# Patient Record
Sex: MALE | Race: WHITE | NOT HISPANIC OR LATINO | Employment: PART TIME | URBAN - METROPOLITAN AREA
[De-identification: names, ages, dates, MRNs, and addresses within clinical notes are randomized per-mention and may not be internally consistent; named-entity substitution may affect disease eponyms.]

---

## 2019-05-10 ENCOUNTER — TELEPHONE (OUTPATIENT)
Dept: INTERNAL MEDICINE | Facility: CLINIC | Age: 34
End: 2019-05-10

## 2019-05-10 NOTE — TELEPHONE ENCOUNTER
PATIENT CALLED TO LET DR. IRENE KNOW THAT HE WALKED 1,543 STEPS TODAY AND HE IS PROUD OF HIMSELF.    GOING TO THE [facility] AND WORKING WITH A  AND HE FEELS GOOD ABOUT HIMSELF.

## 2019-06-18 PROBLEM — Z00.00 ANNUAL PHYSICAL EXAM: Status: ACTIVE | Noted: 2019-06-18

## 2019-06-18 PROBLEM — F33.1 MODERATE EPISODE OF RECURRENT MAJOR DEPRESSIVE DISORDER: Status: ACTIVE | Noted: 2019-06-18

## 2019-06-18 PROBLEM — N18.2 CHRONIC KIDNEY DISEASE, STAGE 2 (MILD): Status: ACTIVE | Noted: 2019-06-18

## 2019-06-18 PROBLEM — F42.9 OBSESSIVE COMPULSIVE DISORDER: Status: ACTIVE | Noted: 2019-06-18

## 2019-06-18 PROBLEM — J30.9 ALLERGIC RHINITIS: Status: ACTIVE | Noted: 2019-06-18

## 2019-06-18 PROBLEM — F84.5 ASPERGER'S DISORDER: Status: ACTIVE | Noted: 2019-06-18

## 2019-06-18 PROBLEM — R73.03 PREDIABETES: Status: ACTIVE | Noted: 2019-06-18

## 2019-06-18 PROBLEM — E78.5 HYPERLIPIDEMIA: Status: ACTIVE | Noted: 2019-06-18

## 2019-06-18 PROBLEM — F90.9 ATTENTION DEFICIT DISORDER OF CHILDHOOD WITH HYPERACTIVITY: Status: ACTIVE | Noted: 2019-06-18

## 2019-06-18 PROBLEM — F41.1 ANXIETY STATE: Status: ACTIVE | Noted: 2019-06-18

## 2019-06-18 PROBLEM — R00.0 TACHYCARDIA: Status: ACTIVE | Noted: 2019-06-18

## 2019-07-02 ENCOUNTER — OFFICE VISIT (OUTPATIENT)
Dept: INTERNAL MEDICINE | Facility: CLINIC | Age: 34
End: 2019-07-02

## 2019-07-02 ENCOUNTER — LAB (OUTPATIENT)
Dept: LAB | Facility: HOSPITAL | Age: 34
End: 2019-07-02

## 2019-07-02 VITALS
DIASTOLIC BLOOD PRESSURE: 80 MMHG | HEART RATE: 64 BPM | HEIGHT: 64 IN | BODY MASS INDEX: 27.14 KG/M2 | SYSTOLIC BLOOD PRESSURE: 126 MMHG | WEIGHT: 159 LBS

## 2019-07-02 DIAGNOSIS — R73.03 PREDIABETES: ICD-10-CM

## 2019-07-02 DIAGNOSIS — N18.2 CHRONIC KIDNEY DISEASE, STAGE 2 (MILD): ICD-10-CM

## 2019-07-02 DIAGNOSIS — F33.1 MODERATE EPISODE OF RECURRENT MAJOR DEPRESSIVE DISORDER (HCC): ICD-10-CM

## 2019-07-02 DIAGNOSIS — E78.2 MIXED HYPERLIPIDEMIA: ICD-10-CM

## 2019-07-02 DIAGNOSIS — F84.5 ASPERGER'S DISORDER: ICD-10-CM

## 2019-07-02 DIAGNOSIS — Z00.00 ANNUAL PHYSICAL EXAM: Primary | ICD-10-CM

## 2019-07-02 LAB
ALBUMIN SERPL-MCNC: 4.4 G/DL (ref 3.5–5.2)
ALBUMIN/GLOB SERPL: 1.3 G/DL
ALP SERPL-CCNC: 56 U/L (ref 39–117)
ALT SERPL W P-5'-P-CCNC: 50 U/L (ref 1–41)
ANION GAP SERPL CALCULATED.3IONS-SCNC: 13.3 MMOL/L (ref 5–15)
AST SERPL-CCNC: 31 U/L (ref 1–40)
BASOPHILS # BLD AUTO: 0.08 10*3/MM3 (ref 0–0.2)
BASOPHILS NFR BLD AUTO: 0.9 % (ref 0–1.5)
BILIRUB SERPL-MCNC: 0.3 MG/DL (ref 0.2–1.2)
BUN BLD-MCNC: 9 MG/DL (ref 6–20)
BUN/CREAT SERPL: 6.9 (ref 7–25)
CALCIUM SPEC-SCNC: 10 MG/DL (ref 8.6–10.5)
CHLORIDE SERPL-SCNC: 102 MMOL/L (ref 98–107)
CHOLEST SERPL-MCNC: 209 MG/DL (ref 0–200)
CO2 SERPL-SCNC: 27.7 MMOL/L (ref 22–29)
CREAT BLD-MCNC: 1.31 MG/DL (ref 0.76–1.27)
DEPRECATED RDW RBC AUTO: 42.5 FL (ref 37–54)
EOSINOPHIL # BLD AUTO: 0.13 10*3/MM3 (ref 0–0.4)
EOSINOPHIL NFR BLD AUTO: 1.4 % (ref 0.3–6.2)
ERYTHROCYTE [DISTWIDTH] IN BLOOD BY AUTOMATED COUNT: 12.9 % (ref 12.3–15.4)
GFR SERPL CREATININE-BSD FRML MDRD: 63 ML/MIN/1.73
GLOBULIN UR ELPH-MCNC: 3.5 GM/DL
GLUCOSE BLD-MCNC: 88 MG/DL (ref 65–99)
HBA1C MFR BLD: 5.8 % (ref 4.8–5.6)
HCT VFR BLD AUTO: 50.8 % (ref 37.5–51)
HDLC SERPL-MCNC: 30 MG/DL (ref 40–60)
HGB BLD-MCNC: 16.1 G/DL (ref 13–17.7)
IMM GRANULOCYTES # BLD AUTO: 0.02 10*3/MM3 (ref 0–0.05)
IMM GRANULOCYTES NFR BLD AUTO: 0.2 % (ref 0–0.5)
LDLC SERPL CALC-MCNC: 141 MG/DL (ref 0–100)
LDLC/HDLC SERPL: 4.71 {RATIO}
LYMPHOCYTES # BLD AUTO: 3.72 10*3/MM3 (ref 0.7–3.1)
LYMPHOCYTES NFR BLD AUTO: 40.1 % (ref 19.6–45.3)
MCH RBC QN AUTO: 28.5 PG (ref 26.6–33)
MCHC RBC AUTO-ENTMCNC: 31.7 G/DL (ref 31.5–35.7)
MCV RBC AUTO: 89.9 FL (ref 79–97)
MONOCYTES # BLD AUTO: 0.6 10*3/MM3 (ref 0.1–0.9)
MONOCYTES NFR BLD AUTO: 6.5 % (ref 5–12)
NEUTROPHILS # BLD AUTO: 4.73 10*3/MM3 (ref 1.7–7)
NEUTROPHILS NFR BLD AUTO: 50.9 % (ref 42.7–76)
NRBC BLD AUTO-RTO: 0 /100 WBC (ref 0–0.2)
PLATELET # BLD AUTO: 234 10*3/MM3 (ref 140–450)
PMV BLD AUTO: 10.9 FL (ref 6–12)
POTASSIUM BLD-SCNC: 3.9 MMOL/L (ref 3.5–5.2)
PROT SERPL-MCNC: 7.9 G/DL (ref 6–8.5)
RBC # BLD AUTO: 5.65 10*6/MM3 (ref 4.14–5.8)
SODIUM BLD-SCNC: 143 MMOL/L (ref 136–145)
TRIGL SERPL-MCNC: 188 MG/DL (ref 0–150)
VLDLC SERPL-MCNC: 37.6 MG/DL (ref 5–40)
WBC NRBC COR # BLD: 9.28 10*3/MM3 (ref 3.4–10.8)

## 2019-07-02 PROCEDURE — 83036 HEMOGLOBIN GLYCOSYLATED A1C: CPT

## 2019-07-02 PROCEDURE — 80061 LIPID PANEL: CPT

## 2019-07-02 PROCEDURE — 80053 COMPREHEN METABOLIC PANEL: CPT

## 2019-07-02 PROCEDURE — 85025 COMPLETE CBC W/AUTO DIFF WBC: CPT

## 2019-07-02 PROCEDURE — G0439 PPPS, SUBSEQ VISIT: HCPCS | Performed by: INTERNAL MEDICINE

## 2019-07-02 RX ORDER — CHOLECALCIFEROL (VITAMIN D3) 125 MCG
10 CAPSULE ORAL NIGHTLY PRN
COMMUNITY

## 2019-07-02 NOTE — PROGRESS NOTES
QUICK REFERENCE INFORMATION:  The ABCs of the Annual Wellness Visit    Subsequent Medicare Wellness Visit    HEALTH RISK ASSESSMENT    1985    Recent Hospitalizations:  No hospitalization(s) within the last year..        Current Medical Providers:  Patient Care Team:  Moe Gold MD as PCP - General  Moe Gold MD as PCP - Family Medicine        Smoking Status:  Social History     Tobacco Use   Smoking Status Never Smoker   Smokeless Tobacco Never Used       Alcohol Consumption:  Social History     Substance and Sexual Activity   Alcohol Use No       Depression Screen:   PHQ-2/PHQ-9 Depression Screening 7/2/2019   Little interest or pleasure in doing things 0   Feeling down, depressed, or hopeless 0   Total Score 0       Health Habits and Functional and Cognitive Screening:  Functional & Cognitive Status 7/2/2019   Do you have difficulty preparing food and eating? No   Do you have difficulty bathing yourself, getting dressed or grooming yourself? No   Do you have difficulty using the toilet? No   Do you have difficulty moving around from place to place? No   Do you have trouble with steps or getting out of a bed or a chair? No   In the past year have you fallen or experienced a near fall? No   Current Diet Well Balanced Diet   Dental Exam Up to date   Eye Exam Up to date   Exercise (times per week) 3 times per week   Current Exercise Activities Include Walking   Do you need help using the phone?  No   Are you deaf or do you have serious difficulty hearing?  Yes   Do you need help with transportation? Yes   Do you need help shopping? No   Do you need help preparing meals?  No   Do you need help with housework?  No   Do you need help with laundry? No   Do you need help taking your medications? No   Do you need help managing money? No   Do you ever drive or ride in a car without wearing a seat belt? No   Have you felt unusual stress, anger or loneliness in the last month? No   Who do you  live with? Alone   If you need help, do you have trouble finding someone available to you? No   Have you been bothered in the last four weeks by sexual problems? No   Do you have difficulty concentrating, remembering or making decisions? No           Does the patient have evidence of cognitive impairment? No            Aspirin use counseling: Does not need ASA (and currently is not on it)      Recent Lab Results:  CMP:  Lab Results   Component Value Date    BUN 9 07/02/2019    CREATININE 1.31 (H) 07/02/2019    EGFRIFNONA 63 07/02/2019    BCR 6.9 (L) 07/02/2019     07/02/2019    K 3.9 07/02/2019    CO2 27.7 07/02/2019    CALCIUM 10.0 07/02/2019    ALBUMIN 4.40 07/02/2019    BILITOT 0.3 07/02/2019    ALKPHOS 56 07/02/2019    AST 31 07/02/2019    ALT 50 (H) 07/02/2019     HbA1c:  Lab Results   Component Value Date    HGBA1C 5.80 (H) 07/02/2019    HGBA1C 5.8 12/08/2015     Microalbumin:  No results found for: MICROALBUR, POCMALB, POCCREAT  Lipid Panel  Lab Results   Component Value Date    CHOL 209 (H) 07/02/2019    TRIG 188 (H) 07/02/2019    HDL 30 (L) 07/02/2019     (H) 07/02/2019    AST 31 07/02/2019    ALT 50 (H) 07/02/2019       Visual Acuity:  No exam data present    Age-appropriate Screening Schedule:  Refer to the list below for future screening recommendations based on patient's age, sex and/or medical conditions. Orders for these recommended tests are listed in the plan section. The patient has been provided with a written plan.    Health Maintenance   Topic Date Due   • TDAP/TD VACCINES (1 - Tdap) 09/14/2004   • INFLUENZA VACCINE  08/01/2019   • LIPID PANEL  07/02/2020        Subjective   History of Present Illness    Ever Freeman is a 33 y.o. male who presents for a Subsequent Wellness Visit.  Overall he feels well.  He is trying to walk 5 or 6000 steps a day to lose weight.  He works 2 to 3 days a week at [employer].  Sees his psychiatrist regularly.  He still has the support of his  parents.    CHRONIC CONDITIONS    The following portions of the patient's history were reviewed and updated as appropriate: allergies, current medications, past family history, past medical history, past social history, past surgical history and problem list.    Outpatient Medications Prior to Visit   Medication Sig Dispense Refill   • busPIRone (BUSPAR) 30 MG tablet Take 30 mg by mouth Every 12 (Twelve) Hours.     • clonazePAM (KLONOPIN) 1 MG tablet Take 0.5 mg by mouth As Needed.     • fluvoxaMINE (LUVOX) 100 MG tablet Take 150 mg by mouth 2 (Two) Times a Day.     • guanFACINE (TENEX) 1 MG tablet Take  by mouth Every 12 (Twelve) Hours.     • hydrOXYzine (VISTARIL) 50 MG capsule Take 50 mg by mouth 3 (Three) Times a Day As Needed.     • melatonin 5 MG tablet tablet Take 5 mg by mouth At Night As Needed.     • ziprasidone (GEODON) 80 MG capsule Take 80 mg by mouth 2 (Two) Times a Day With Meals.     • triamcinolone (KENALOG) 0.1 % ointment Apply  topically to the appropriate area as directed 2 (Two) Times a Day. 15 g 0     No facility-administered medications prior to visit.        Patient Active Problem List   Diagnosis   • Anxiety state   • Obsessive compulsive disorder   • Tachycardia   • Annual physical exam   • Moderate episode of recurrent major depressive disorder (CMS/HCC)   • Chronic kidney disease, stage 2 (mild)   • Hyperlipidemia   • Allergic rhinitis   • Asperger's disorder   • Prediabetes   • Attention deficit disorder of childhood with hyperactivity       Advance Care Planning:  Patient does not have an advance directive - not interested in additional information    Identification of Risk Factors:  Risk factors include: weight , unhealthy diet, cardiovascular risk and inactivity.    Review of Systems   Constitutional: Negative for chills, fatigue and fever.   HENT: Negative for congestion, ear pain and sinus pressure.    Respiratory: Negative for cough, chest tightness, shortness of breath and  wheezing.    Cardiovascular: Negative for chest pain and palpitations.   Gastrointestinal: Negative for abdominal pain, blood in stool and constipation.   Skin: Negative for color change.   Allergic/Immunologic: Negative for environmental allergies.   Neurological: Negative for dizziness, speech difficulty and headaches.   Psychiatric/Behavioral: Negative for confusion. The patient is not nervous/anxious.        Compared to one year ago, the patient feels his physical health is better.  Compared to one year ago, the patient feels his mental health is better.    Objective     Physical Exam   Constitutional: He is oriented to person, place, and time. He appears well-developed and well-nourished. He appears overweight.   HENT:   Head: Normocephalic and atraumatic.   Right Ear: External ear normal.   Left Ear: External ear normal.   Nose: Nose normal.   Mouth/Throat: Oropharynx is clear and moist. No oropharyngeal exudate.   Eyes: Conjunctivae and EOM are normal. Pupils are equal, round, and reactive to light.   Neck: Normal range of motion. Neck supple. No JVD present. No thyromegaly present.   Cardiovascular: Normal rate, regular rhythm, normal heart sounds and intact distal pulses. Exam reveals no gallop and no friction rub.   No murmur heard.  Pulmonary/Chest: Effort normal and breath sounds normal. No respiratory distress. He has no wheezes. He has no rales. He exhibits no tenderness.   Abdominal: Soft. Bowel sounds are normal. He exhibits no distension and no mass. There is no tenderness. There is no rebound and no guarding. No hernia.   Musculoskeletal: Normal range of motion. He exhibits no tenderness.   Lymphadenopathy:     He has no cervical adenopathy.   Neurological: He is alert and oriented to person, place, and time. He displays normal reflexes. No cranial nerve deficit or sensory deficit. He exhibits normal muscle tone. Coordination normal.   Skin: Skin is warm and dry. No rash noted. No erythema.  "  Psychiatric: He has a normal mood and affect. His behavior is normal. Judgment and thought content normal.   Nursing note and vitals reviewed.       Procedures     Vitals:    07/02/19 0939   BP: 126/80   BP Location: Left arm   Patient Position: Sitting   Cuff Size: Adult   Pulse: 64   Weight: 72.1 kg (159 lb)   Height: 162.6 cm (64\")   PainSc: 0-No pain       Patient's Body mass index is 27.29 kg/m². BMI is above normal parameters. Recommendations include: exercise counseling and nutrition counseling.      Assessment/Plan   Problem List Items Addressed This Visit        Cardiovascular and Mediastinum    Hyperlipidemia    Relevant Orders    Lipid Panel (Completed)       Genitourinary    Chronic kidney disease, stage 2 (mild)    Relevant Orders    CBC & Differential (Completed)       Other    Annual physical exam - Primary    Moderate episode of recurrent major depressive disorder (CMS/HCC)    Relevant Medications    ziprasidone (GEODON) 80 MG capsule    busPIRone (BUSPAR) 30 MG tablet    hydrOXYzine (VISTARIL) 50 MG capsule    fluvoxaMINE (LUVOX) 100 MG tablet    Asperger's disorder    Relevant Medications    ziprasidone (GEODON) 80 MG capsule    busPIRone (BUSPAR) 30 MG tablet    hydrOXYzine (VISTARIL) 50 MG capsule    fluvoxaMINE (LUVOX) 100 MG tablet    Prediabetes    Relevant Orders    Comprehensive Metabolic Panel (Completed)    Hemoglobin A1c (Completed)        Patient Self-Management and Personalized Health Advice  The patient has been provided with information about: diet, exercise, weight management and prevention of cardiac or vascular disease and preventive services including:   · Exercise counseling provided, Nutrition counseling provided.    Outpatient Encounter Medications as of 7/2/2019   Medication Sig Dispense Refill   • busPIRone (BUSPAR) 30 MG tablet Take 30 mg by mouth Every 12 (Twelve) Hours.     • clonazePAM (KLONOPIN) 1 MG tablet Take 0.5 mg by mouth As Needed.     • fluvoxaMINE (LUVOX) 100 " MG tablet Take 150 mg by mouth 2 (Two) Times a Day.     • guanFACINE (TENEX) 1 MG tablet Take  by mouth Every 12 (Twelve) Hours.     • hydrOXYzine (VISTARIL) 50 MG capsule Take 50 mg by mouth 3 (Three) Times a Day As Needed.     • melatonin 5 MG tablet tablet Take 5 mg by mouth At Night As Needed.     • ziprasidone (GEODON) 80 MG capsule Take 80 mg by mouth 2 (Two) Times a Day With Meals.     • [DISCONTINUED] triamcinolone (KENALOG) 0.1 % ointment Apply  topically to the appropriate area as directed 2 (Two) Times a Day. 15 g 0     No facility-administered encounter medications on file as of 7/2/2019.        Reviewed use of high risk medication in the elderly: no  Reviewed for potential of harmful drug interactions in the elderly: no    Follow Up:  Return in about 1 year (around 7/2/2020) for Annual physical 1visit.     An After Visit Summary and PPPS with all of these plans were given to the patient.

## 2019-07-12 ENCOUNTER — TELEPHONE (OUTPATIENT)
Dept: INTERNAL MEDICINE | Facility: CLINIC | Age: 34
End: 2019-07-12

## 2019-07-12 NOTE — TELEPHONE ENCOUNTER
PT CALLED STATING HE JUST WANTED TO LET YOU KNOW HE WAS VERY HAPPY WITH THE OUTCOME OF HIS VISIT THE THE OTHER DAY AND HOW FRIENDLY YOU WERE AND HE WILL KEEP UP WITH YOUR RECOMMENDATIONS   JUST WANTED TO F/U ON HIS PHYSICAL

## 2020-03-18 ENCOUNTER — TELEPHONE (OUTPATIENT)
Dept: INTERNAL MEDICINE | Facility: CLINIC | Age: 35
End: 2020-03-18

## 2020-03-18 NOTE — TELEPHONE ENCOUNTER
Pt called and stated he has lost a lot of weight and he is watching it a lot more. Pt stated that he was 159 now he is 140.6 and he is wondering if this is good or if he wants him to lose more. Please advise [phone]

## 2020-03-18 NOTE — TELEPHONE ENCOUNTER
Called patient to see if he had been sick he said no he has just been cutting back on his sugared soda's

## 2020-06-02 ENCOUNTER — TELEPHONE (OUTPATIENT)
Dept: INTERNAL MEDICINE | Facility: CLINIC | Age: 35
End: 2020-06-02

## 2020-06-02 NOTE — TELEPHONE ENCOUNTER
PATIENT JUST REPORTING THAT HE IS FEELING VERY WELL AND HIS WEIGHT IS STEADY AND HAVING NO ISSUES. HE WAS LAST SEEN 06/26/2018.

## 2020-07-09 ENCOUNTER — OFFICE VISIT (OUTPATIENT)
Dept: INTERNAL MEDICINE | Facility: CLINIC | Age: 35
End: 2020-07-09

## 2020-07-09 ENCOUNTER — LAB (OUTPATIENT)
Dept: LAB | Facility: HOSPITAL | Age: 35
End: 2020-07-09

## 2020-07-09 VITALS
HEART RATE: 60 BPM | WEIGHT: 155 LBS | HEIGHT: 64 IN | SYSTOLIC BLOOD PRESSURE: 118 MMHG | TEMPERATURE: 97.3 F | DIASTOLIC BLOOD PRESSURE: 76 MMHG | BODY MASS INDEX: 26.46 KG/M2

## 2020-07-09 DIAGNOSIS — R73.03 PREDIABETES: ICD-10-CM

## 2020-07-09 DIAGNOSIS — E78.2 MIXED HYPERLIPIDEMIA: ICD-10-CM

## 2020-07-09 DIAGNOSIS — N18.2 CHRONIC KIDNEY DISEASE, STAGE 2 (MILD): ICD-10-CM

## 2020-07-09 DIAGNOSIS — Z00.00 PREVENTATIVE HEALTH CARE: Primary | ICD-10-CM

## 2020-07-09 DIAGNOSIS — F33.1 MODERATE EPISODE OF RECURRENT MAJOR DEPRESSIVE DISORDER (HCC): ICD-10-CM

## 2020-07-09 LAB
ALBUMIN SERPL-MCNC: 4.7 G/DL (ref 3.5–5.2)
ALBUMIN UR-MCNC: <1.2 MG/DL
ALBUMIN/GLOB SERPL: 1.6 G/DL
ALP SERPL-CCNC: 43 U/L (ref 39–117)
ALT SERPL W P-5'-P-CCNC: 34 U/L (ref 1–41)
ANION GAP SERPL CALCULATED.3IONS-SCNC: 11.2 MMOL/L (ref 5–15)
AST SERPL-CCNC: 26 U/L (ref 1–40)
BASOPHILS # BLD AUTO: 0.08 10*3/MM3 (ref 0–0.2)
BASOPHILS NFR BLD AUTO: 0.7 % (ref 0–1.5)
BILIRUB SERPL-MCNC: 0.3 MG/DL (ref 0–1.2)
BILIRUB UR QL STRIP: NEGATIVE
BUN SERPL-MCNC: 15 MG/DL (ref 6–20)
BUN/CREAT SERPL: 10 (ref 7–25)
CALCIUM SPEC-SCNC: 10.1 MG/DL (ref 8.6–10.5)
CHLORIDE SERPL-SCNC: 100 MMOL/L (ref 98–107)
CHOLEST SERPL-MCNC: 216 MG/DL (ref 0–200)
CLARITY UR: CLEAR
CO2 SERPL-SCNC: 29.8 MMOL/L (ref 22–29)
COLOR UR: YELLOW
CREAT SERPL-MCNC: 1.5 MG/DL (ref 0.76–1.27)
CREAT UR-MCNC: 16.8 MG/DL
DEPRECATED RDW RBC AUTO: 43.6 FL (ref 37–54)
EOSINOPHIL # BLD AUTO: 0.18 10*3/MM3 (ref 0–0.4)
EOSINOPHIL NFR BLD AUTO: 1.7 % (ref 0.3–6.2)
ERYTHROCYTE [DISTWIDTH] IN BLOOD BY AUTOMATED COUNT: 13.8 % (ref 12.3–15.4)
GFR SERPL CREATININE-BSD FRML MDRD: 54 ML/MIN/1.73
GLOBULIN UR ELPH-MCNC: 3 GM/DL
GLUCOSE SERPL-MCNC: 98 MG/DL (ref 65–99)
GLUCOSE UR STRIP-MCNC: NEGATIVE MG/DL
HBA1C MFR BLD: 5.76 % (ref 4.8–5.6)
HCT VFR BLD AUTO: 47.5 % (ref 37.5–51)
HDLC SERPL-MCNC: 40 MG/DL (ref 40–60)
HGB BLD-MCNC: 16.2 G/DL (ref 13–17.7)
HGB UR QL STRIP.AUTO: NEGATIVE
IMM GRANULOCYTES # BLD AUTO: 0.02 10*3/MM3 (ref 0–0.05)
IMM GRANULOCYTES NFR BLD AUTO: 0.2 % (ref 0–0.5)
KETONES UR QL STRIP: NEGATIVE
LDLC SERPL CALC-MCNC: 146 MG/DL (ref 0–100)
LDLC/HDLC SERPL: 3.65 {RATIO}
LEUKOCYTE ESTERASE UR QL STRIP.AUTO: NEGATIVE
LYMPHOCYTES # BLD AUTO: 5.66 10*3/MM3 (ref 0.7–3.1)
LYMPHOCYTES NFR BLD AUTO: 52.4 % (ref 19.6–45.3)
MCH RBC QN AUTO: 29.6 PG (ref 26.6–33)
MCHC RBC AUTO-ENTMCNC: 34.1 G/DL (ref 31.5–35.7)
MCV RBC AUTO: 86.7 FL (ref 79–97)
MICROALBUMIN/CREAT UR: NORMAL MG/G{CREAT}
MONOCYTES # BLD AUTO: 0.74 10*3/MM3 (ref 0.1–0.9)
MONOCYTES NFR BLD AUTO: 6.8 % (ref 5–12)
NEUTROPHILS NFR BLD AUTO: 38.2 % (ref 42.7–76)
NEUTROPHILS NFR BLD AUTO: 4.13 10*3/MM3 (ref 1.7–7)
NITRITE UR QL STRIP: NEGATIVE
NRBC BLD AUTO-RTO: 0 /100 WBC (ref 0–0.2)
PH UR STRIP.AUTO: 6.5 [PH] (ref 5–8)
PLATELET # BLD AUTO: 225 10*3/MM3 (ref 140–450)
PMV BLD AUTO: 10.9 FL (ref 6–12)
POTASSIUM SERPL-SCNC: 3.9 MMOL/L (ref 3.5–5.2)
PROT SERPL-MCNC: 7.7 G/DL (ref 6–8.5)
PROT UR QL STRIP: NEGATIVE
RBC # BLD AUTO: 5.48 10*6/MM3 (ref 4.14–5.8)
SODIUM SERPL-SCNC: 141 MMOL/L (ref 136–145)
SP GR UR STRIP: 1.01 (ref 1–1.03)
TRIGL SERPL-MCNC: 151 MG/DL (ref 0–150)
UROBILINOGEN UR QL STRIP: NORMAL
VLDLC SERPL-MCNC: 30.2 MG/DL (ref 5–40)
WBC # BLD AUTO: 10.81 10*3/MM3 (ref 3.4–10.8)

## 2020-07-09 PROCEDURE — 85025 COMPLETE CBC W/AUTO DIFF WBC: CPT

## 2020-07-09 PROCEDURE — 80053 COMPREHEN METABOLIC PANEL: CPT

## 2020-07-09 PROCEDURE — G0439 PPPS, SUBSEQ VISIT: HCPCS | Performed by: INTERNAL MEDICINE

## 2020-07-09 PROCEDURE — 83036 HEMOGLOBIN GLYCOSYLATED A1C: CPT

## 2020-07-09 PROCEDURE — 99395 PREV VISIT EST AGE 18-39: CPT | Performed by: INTERNAL MEDICINE

## 2020-07-09 PROCEDURE — 82570 ASSAY OF URINE CREATININE: CPT

## 2020-07-09 PROCEDURE — 81003 URINALYSIS AUTO W/O SCOPE: CPT

## 2020-07-09 PROCEDURE — 82043 UR ALBUMIN QUANTITATIVE: CPT

## 2020-07-09 PROCEDURE — 80061 LIPID PANEL: CPT

## 2020-07-09 NOTE — PROGRESS NOTES
QUICK REFERENCE INFORMATION:  The ABCs of the Annual Wellness Visit    Subsequent Medicare Wellness Visit    HEALTH RISK ASSESSMENT    1985    Recent Hospitalizations:  No hospitalization(s) within the last year..        Current Medical Providers:  Patient Care Team:  Moe Gold MD as PCP - General  Moe Gold MD as PCP - Family Medicine        Smoking Status:  Social History     Tobacco Use   Smoking Status Never Smoker   Smokeless Tobacco Never Used       Alcohol Consumption:  Social History     Substance and Sexual Activity   Alcohol Use No       Depression Screen:   PHQ-2/PHQ-9 Depression Screening 7/9/2020   Little interest or pleasure in doing things 0   Feeling down, depressed, or hopeless 0   Total Score 0       Health Habits and Functional and Cognitive Screening:  Functional & Cognitive Status 7/9/2020   Do you have difficulty preparing food and eating? No   Do you have difficulty bathing yourself, getting dressed or grooming yourself? No   Do you have difficulty using the toilet? No   Do you have difficulty moving around from place to place? No   Do you have trouble with steps or getting out of a bed or a chair? No   Current Diet Well Balanced Diet   Dental Exam Up to date   Eye Exam Up to date   Exercise (times per week) 3 times per week   Current Exercise Activities Include Walking   Do you need help using the phone?  No   Are you deaf or do you have serious difficulty hearing?  Yes   Do you need help with transportation? Yes   Do you need help shopping? No   Do you need help preparing meals?  No   Do you need help with housework?  No   Do you need help with laundry? No   Do you need help taking your medications? No   Do you need help managing money? Yes   Do you ever drive or ride in a car without wearing a seat belt? No   Have you felt unusual stress, anger or loneliness in the last month? Yes   Who do you live with? Alone   If you need help, do you have trouble finding  someone available to you? No   Have you been bothered in the last four weeks by sexual problems? No   Do you have difficulty concentrating, remembering or making decisions? No       Fall Risk Screen:  VERONICA Fall Risk Assessment has not been completed.    ACE III MINI        Does the patient have evidence of cognitive impairment? Yes    Aspirin use counseling: Does not need ASA (and currently is not on it)    Recent Lab Results:  CMP:  Lab Results   Component Value Date    BUN 9 07/02/2019    CREATININE 1.31 (H) 07/02/2019    EGFRIFNONA 63 07/02/2019    BCR 6.9 (L) 07/02/2019     07/02/2019    K 3.9 07/02/2019    CO2 27.7 07/02/2019    CALCIUM 10.0 07/02/2019    ALBUMIN 4.40 07/02/2019    BILITOT 0.3 07/02/2019    ALKPHOS 56 07/02/2019    AST 31 07/02/2019    ALT 50 (H) 07/02/2019     HbA1c:  Lab Results   Component Value Date    HGBA1C 5.80 (H) 07/02/2019    HGBA1C 5.8 12/08/2015     Microalbumin:  No results found for: MICROALBUR, POCMALB, POCCREAT  Lipid Panel  Lab Results   Component Value Date    CHOL 209 (H) 07/02/2019    TRIG 188 (H) 07/02/2019    HDL 30 (L) 07/02/2019     (H) 07/02/2019    AST 31 07/02/2019    ALT 50 (H) 07/02/2019       Visual Acuity:  No exam data present    Age-appropriate Screening Schedule:  Refer to the list below for future screening recommendations based on patient's age, sex and/or medical conditions. Orders for these recommended tests are listed in the plan section. The patient has been provided with a written plan.    Health Maintenance   Topic Date Due   • TDAP/TD VACCINES (1 - Tdap) 09/14/1996   • LIPID PANEL  07/02/2020   • INFLUENZA VACCINE  08/01/2020        Subjective   History of Present Illness    Ever Freeman is a 34 y.o. male who presents for a Subsequent Wellness Visit.    CHRONIC CONDITIONS    The following portions of the patient's history were reviewed and updated as appropriate: allergies, current medications, past family history, past  medical history, past social history, past surgical history and problem list.    Outpatient Medications Prior to Visit   Medication Sig Dispense Refill   • busPIRone (BUSPAR) 30 MG tablet Take 30 mg by mouth Every 12 (Twelve) Hours.     • clonazePAM (KLONOPIN) 1 MG tablet Take 0.5 mg by mouth As Needed.     • fluvoxaMINE (LUVOX) 100 MG tablet Take 150 mg by mouth 2 (Two) Times a Day.     • guanFACINE (TENEX) 1 MG tablet Take  by mouth Every 12 (Twelve) Hours.     • hydrOXYzine (VISTARIL) 50 MG capsule Take 50 mg by mouth 3 (Three) Times a Day As Needed.     • melatonin 5 MG tablet tablet Take 5 mg by mouth At Night As Needed.     • ziprasidone (GEODON) 80 MG capsule Take 80 mg by mouth 2 (Two) Times a Day With Meals.       No facility-administered medications prior to visit.        Patient Active Problem List   Diagnosis   • Anxiety state   • Obsessive compulsive disorder   • Tachycardia   • Annual physical exam   • Moderate episode of recurrent major depressive disorder (CMS/HCC)   • Chronic kidney disease, stage 2 (mild)   • Hyperlipidemia   • Allergic rhinitis   • Asperger's disorder   • Prediabetes   • Attention deficit disorder of childhood with hyperactivity       Advance Care Planning:  ACP discussion was held with the patient during this visit. Patient has an advance directive in EMR which is still valid.     Identification of Risk Factors:  Risk factors include: Advance Directive Discussion.    Review of Systems   Constitutional: Negative for chills, fatigue and fever.   HENT: Negative for congestion, ear pain and sinus pressure.    Respiratory: Negative for cough, chest tightness, shortness of breath and wheezing.    Cardiovascular: Negative for chest pain and palpitations.   Gastrointestinal: Negative for abdominal pain, blood in stool and constipation.   Skin: Negative for color change.   Allergic/Immunologic: Negative for environmental allergies.   Neurological: Negative for dizziness, speech difficulty  and headaches.   Psychiatric/Behavioral: Negative for confusion. The patient is not nervous/anxious.        Compared to one year ago, the patient feels his physical health is the same.  Compared to one year ago, the patient feels his mental health is better.    Objective     Physical Exam   Constitutional: He is oriented to person, place, and time. He appears well-developed and well-nourished.   HENT:   Head: Normocephalic and atraumatic.   Right Ear: External ear normal.   Left Ear: External ear normal.   Nose: Nose normal.   Mouth/Throat: Oropharynx is clear and moist. No oropharyngeal exudate.   Eyes: Pupils are equal, round, and reactive to light. Conjunctivae and EOM are normal.   Neck: Normal range of motion. Neck supple. No JVD present. No thyromegaly present.   Cardiovascular: Normal rate, regular rhythm, normal heart sounds and intact distal pulses. Exam reveals no gallop and no friction rub.   No murmur heard.  Pulmonary/Chest: Effort normal and breath sounds normal. No respiratory distress. He has no wheezes. He has no rales. He exhibits no tenderness.   Abdominal: Soft. Bowel sounds are normal. He exhibits no distension and no mass. There is no tenderness. There is no rebound and no guarding. No hernia.   Musculoskeletal: Normal range of motion. He exhibits no tenderness.   Lymphadenopathy:     He has no cervical adenopathy.   Neurological: He is alert and oriented to person, place, and time. He displays normal reflexes. No cranial nerve deficit or sensory deficit. He exhibits normal muscle tone. Coordination normal.   Skin: Skin is warm and dry. No rash noted. No erythema.   Psychiatric: He has a normal mood and affect. His behavior is normal. Judgment and thought content normal.   Nursing note and vitals reviewed.       Procedures     Vitals:    07/09/20 0819   BP: 118/76   BP Location: Right arm   Patient Position: Sitting   Cuff Size: Adult   Pulse: 60   Temp: 97.3 °F (36.3 °C)   Weight: 70.3 kg (155  "lb)   Height: 162.6 cm (64.02\")   PainSc: 0-No pain       Patient's Body mass index is 26.59 kg/m². BMI is within normal parameters. No follow-up required..      Assessment/Plan   Problem List Items Addressed This Visit        Cardiovascular and Mediastinum    Hyperlipidemia    Relevant Orders    Comprehensive Metabolic Panel    Lipid Panel       Endocrine    Prediabetes    Relevant Orders    Hemoglobin A1c       Genitourinary    Chronic kidney disease, stage 2 (mild)    Relevant Orders    CBC & Differential    Urinalysis With Culture If Indicated - Urine, Clean Catch    Microalbumin / Creatinine Urine Ratio - Urine, Clean Catch       Other    Moderate episode of recurrent major depressive disorder (CMS/HCC)    Relevant Medications    ziprasidone (GEODON) 80 MG capsule    busPIRone (BUSPAR) 30 MG tablet    hydrOXYzine (VISTARIL) 50 MG capsule    fluvoxaMINE (LUVOX) 100 MG tablet      Other Visit Diagnoses     Preventative health care    -  Primary        Patient Self-Management and Personalized Health Advice  The patient has been provided with information about: diet, exercise, weight management and prevention of cardiac or vascular disease and preventive services including:   · Annual Wellness Visit (AWV).    Outpatient Encounter Medications as of 7/9/2020   Medication Sig Dispense Refill   • busPIRone (BUSPAR) 30 MG tablet Take 30 mg by mouth Every 12 (Twelve) Hours.     • clonazePAM (KLONOPIN) 1 MG tablet Take 0.5 mg by mouth As Needed.     • fluvoxaMINE (LUVOX) 100 MG tablet Take 150 mg by mouth 2 (Two) Times a Day.     • guanFACINE (TENEX) 1 MG tablet Take  by mouth Every 12 (Twelve) Hours.     • hydrOXYzine (VISTARIL) 50 MG capsule Take 50 mg by mouth 3 (Three) Times a Day As Needed.     • melatonin 5 MG tablet tablet Take 5 mg by mouth At Night As Needed.     • ziprasidone (GEODON) 80 MG capsule Take 80 mg by mouth 2 (Two) Times a Day With Meals.       No facility-administered encounter medications on file as " of 7/9/2020.      Plan    Overall he is doing very well with improved lifestyle habits.  He is exercising on a regular basis and his weight at home this morning was 142 which gives him a BMI of 24.    Lipid panel is pending, he will continue working on healthy diet, exercise and weight control.    GFR is pending.  He will continue avoidance of NSAIDs.    A1c is pending, treatment remains healthy diet and avoidance of weight gain.    Emotionally he appears to be doing well.  He will continue follow-up with his mental health professionals.    Reviewed use of high risk medication in the elderly: yes  Reviewed for potential of harmful drug interactions in the elderly: yes    Follow Up:  Return in about 1 year (around 7/9/2021) for Medicare Wellness 1 visit.     There are no Patient Instructions on file for this visit.    An After Visit Summary and PPPS with all of these plans were given to the patient.

## 2020-11-17 ENCOUNTER — TELEPHONE (OUTPATIENT)
Dept: INTERNAL MEDICINE | Facility: CLINIC | Age: 35
End: 2020-11-17

## 2020-11-17 NOTE — TELEPHONE ENCOUNTER
PATIENT STATES HE WANTS DR. IRENE TO KNOW THAT HE WENT AND GOT HIS RIGHT EAR CHECKED OUT. PATIENT STATES IT WAS AND EAR INFECTION. PATIENT STATES HE WENT 11/17 AROUND 10:30.

## 2021-01-11 ENCOUNTER — TELEPHONE (OUTPATIENT)
Dept: INTERNAL MEDICINE | Facility: CLINIC | Age: 36
End: 2021-01-11

## 2021-01-11 NOTE — TELEPHONE ENCOUNTER
Patient would like to know if Dr. Gold knew anything about the COVID vaccine.  He can be reached at [phone]

## 2021-01-11 NOTE — TELEPHONE ENCOUNTER
"I called patient. He is wanting the covid vaccine.  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  I told him that once it is available to the public, he will probably be able to get it quickest at his place of employment.  He verbalized understanding.   "

## 2021-03-04 ENCOUNTER — IMMUNIZATION (OUTPATIENT)
Dept: VACCINE CLINIC | Facility: HOSPITAL | Age: 36
End: 2021-03-04

## 2021-03-04 PROCEDURE — 91300 HC SARSCOV02 VAC 30MCG/0.3ML IM: CPT | Performed by: INTERNAL MEDICINE

## 2021-03-04 PROCEDURE — 0001A: CPT | Performed by: INTERNAL MEDICINE

## 2021-03-25 ENCOUNTER — IMMUNIZATION (OUTPATIENT)
Dept: VACCINE CLINIC | Facility: HOSPITAL | Age: 36
End: 2021-03-25

## 2021-03-25 PROCEDURE — 91300 HC SARSCOV02 VAC 30MCG/0.3ML IM: CPT | Performed by: INTERNAL MEDICINE

## 2021-03-25 PROCEDURE — 0002A: CPT | Performed by: INTERNAL MEDICINE

## 2021-05-06 ENCOUNTER — TELEPHONE (OUTPATIENT)
Dept: INTERNAL MEDICINE | Facility: CLINIC | Age: 36
End: 2021-05-06

## 2021-05-06 NOTE — TELEPHONE ENCOUNTER
Pt called stating he has issues with eating. Sometime his stomach gets tight and he does not want to eat, sometimes he has issues with textures of food.    Other times he starts to gag and the gagging gets intense so he will have bouts of emesis.    Pt wants to know if he should see a gastroenterologist.

## 2021-05-12 ENCOUNTER — OFFICE VISIT (OUTPATIENT)
Dept: INTERNAL MEDICINE | Facility: CLINIC | Age: 36
End: 2021-05-12

## 2021-05-12 VITALS
HEART RATE: 76 BPM | WEIGHT: 152.6 LBS | BODY MASS INDEX: 26.05 KG/M2 | TEMPERATURE: 97.8 F | HEIGHT: 64 IN | SYSTOLIC BLOOD PRESSURE: 110 MMHG | DIASTOLIC BLOOD PRESSURE: 72 MMHG

## 2021-05-12 DIAGNOSIS — R10.13 ABDOMINAL DISCOMFORT, EPIGASTRIC: Primary | ICD-10-CM

## 2021-05-12 PROCEDURE — 99213 OFFICE O/P EST LOW 20 MIN: CPT | Performed by: INTERNAL MEDICINE

## 2021-05-12 RX ORDER — OMEPRAZOLE 20 MG/1
20 CAPSULE, DELAYED RELEASE ORAL DAILY
Qty: 30 CAPSULE | Refills: 1 | Status: SHIPPED | OUTPATIENT
Start: 2021-05-12 | End: 2021-06-24 | Stop reason: SDUPTHER

## 2021-06-17 ENCOUNTER — OFFICE VISIT (OUTPATIENT)
Dept: INTERNAL MEDICINE | Facility: CLINIC | Age: 36
End: 2021-06-17

## 2021-06-17 VITALS
HEART RATE: 72 BPM | TEMPERATURE: 99.1 F | WEIGHT: 150.2 LBS | SYSTOLIC BLOOD PRESSURE: 114 MMHG | DIASTOLIC BLOOD PRESSURE: 82 MMHG | BODY MASS INDEX: 25.64 KG/M2 | HEIGHT: 64 IN

## 2021-06-17 DIAGNOSIS — R10.13 ABDOMINAL DISCOMFORT, EPIGASTRIC: Primary | ICD-10-CM

## 2021-06-17 PROCEDURE — 99213 OFFICE O/P EST LOW 20 MIN: CPT | Performed by: INTERNAL MEDICINE

## 2021-06-17 NOTE — PROGRESS NOTES
[facility]     Ever Freeman  1985   [id number]      Patient Care Team:  Moe Gold MD as PCP - General  Moe Gold MD as PCP - Family Medicine    Chief Complaint::   Chief Complaint   Patient presents with   • Abdominal Pain     1 month F/U        HPI  Mr. Palma mon comes in for follow-up of his abdominal discomfort.  Last month he was given a trial of omeprazole for indigestion.  This has resulted in complete resolution of his symptoms.    Chronic Conditions:      Patient Active Problem List   Diagnosis   • Anxiety state   • Obsessive compulsive disorder   • Tachycardia   • Annual physical exam   • Moderate episode of recurrent major depressive disorder (CMS/HCC)   • Chronic kidney disease, stage 2 (mild)   • Hyperlipidemia   • Allergic rhinitis   • Asperger's disorder   • Prediabetes   • Attention deficit disorder of childhood with hyperactivity        Past Medical History:   Diagnosis Date   • Alcohol abuse    • Anxiety    • Asperger's syndrome    • Neurocardiogenic syncope    • OCD (obsessive compulsive disorder)    • Severe hearing loss     Hearing loss, severe right sensorineural       Past Surgical History:   Procedure Laterality Date   • CHOLECYSTECTOMY  09/28/2015   • TYMPANOSTOMY  1986       No family history on file.    Social History     Socioeconomic History   • Marital status: Single     Spouse name: Not on file   • Number of children: Not on file   • Years of education: Not on file   • Highest education level: Not on file   Tobacco Use   • Smoking status: Never Smoker   • Smokeless tobacco: Never Used   Substance and Sexual Activity   • Alcohol use: No   • Drug use: No   • Sexual activity: Defer       Allergies   Allergen Reactions   • Demerol [Meperidine] Hives         Current Outpatient Medications:   •  busPIRone (BUSPAR) 30 MG tablet, Take 45 mg by mouth Every 12 (Twelve) Hours., Disp: , Rfl:   •  clonazePAM (KLONOPIN) 1 MG tablet, Take 0.5  "mg by mouth As Needed., Disp: , Rfl:   •  fluvoxaMINE (LUVOX) 100 MG tablet, Take 150 mg by mouth 2 (Two) Times a Day., Disp: , Rfl:   •  guanFACINE (TENEX) 1 MG tablet, Take  by mouth Every 12 (Twelve) Hours., Disp: , Rfl:   •  melatonin 5 MG tablet tablet, Take 10 mg by mouth At Night As Needed., Disp: , Rfl:   •  omeprazole (PrilOSEC) 20 MG capsule, Take 1 capsule by mouth Daily., Disp: 30 capsule, Rfl: 1  •  ziprasidone (GEODON) 80 MG capsule, Take 80 mg by mouth 2 (Two) Times a Day With Meals., Disp: , Rfl:     Review of Systems   Constitutional: Negative.    Respiratory: Negative.  Negative for chest tightness and shortness of breath.    Cardiovascular: Negative.  Negative for chest pain.   Gastrointestinal: Negative for abdominal pain, blood in stool, constipation and diarrhea.        Vital Signs  Vitals:    06/17/21 1313   BP: 114/82   BP Location: Right arm   Patient Position: Sitting   Cuff Size: Adult   Pulse: 72   Temp: 99.1 °F (37.3 °C)   Weight: 68.1 kg (150 lb 3.2 oz)   Height: 162.6 cm (64.02\")   PainSc:   1   PainLoc: Abdomen       Physical Exam  Vitals reviewed.   Constitutional:       Appearance: He is well-developed.   HENT:      Head: Normocephalic and atraumatic.   Cardiovascular:      Rate and Rhythm: Normal rate and regular rhythm.      Heart sounds: Normal heart sounds. No murmur heard.     Pulmonary:      Effort: Pulmonary effort is normal.      Breath sounds: Normal breath sounds.   Neurological:      Mental Status: He is alert and oriented to person, place, and time.          Procedures    ACE III MINI             Assessment/Plan:    Diagnoses and all orders for this visit:    1. Abdominal discomfort, epigastric (Primary)    Plan    Abdominal discomfort, possibly GERD, with excellent response to PPI.  He will now wean off medication by taking it every other day for the next 3 weeks and then discontinue.  If he has recurrence he may resume medication but at that point would need " EGD.    Patient's Body mass index is 25.77 kg/m². indicating that he is overweight (BMI 25-29.9). Obesity-related health conditions include the following: dyslipidemias. Obesity is unchanged. BMI is is above average; BMI management plan is completed. We discussed portion control, increasing exercise and joining a fitness center or start home based exercise program..        Plan of care reviewed with patient at the conclusion of today's visit. Education was provided regarding diagnosis, management, and any prescribed or recommended OTC medications.Patient verbalizes understanding of and agreement with management plan.         Moe Gold MD

## 2021-06-21 ENCOUNTER — TELEPHONE (OUTPATIENT)
Dept: INTERNAL MEDICINE | Facility: CLINIC | Age: 36
End: 2021-06-21

## 2021-06-21 NOTE — TELEPHONE ENCOUNTER
Discussed with pt and advised per Dr. Gold's note to take qod and then D/C after 3 weeks. He voices understanding

## 2021-06-21 NOTE — TELEPHONE ENCOUNTER
Caller: Ever Freeman    Relationship: Self    Best call back number: [phone]     What medications are you currently taking:   Current Outpatient Medications on File Prior to Visit   Medication Sig Dispense Refill   • busPIRone (BUSPAR) 30 MG tablet Take 45 mg by mouth Every 12 (Twelve) Hours.     • clonazePAM (KLONOPIN) 1 MG tablet Take 0.5 mg by mouth As Needed.     • fluvoxaMINE (LUVOX) 100 MG tablet Take 150 mg by mouth 2 (Two) Times a Day.     • guanFACINE (TENEX) 1 MG tablet Take  by mouth Every 12 (Twelve) Hours.     • melatonin 5 MG tablet tablet Take 10 mg by mouth At Night As Needed.     • omeprazole (PrilOSEC) 20 MG capsule Take 1 capsule by mouth Daily. 30 capsule 1   • ziprasidone (GEODON) 80 MG capsule Take 80 mg by mouth 2 (Two) Times a Day With Meals.       No current facility-administered medications on file prior to visit.        When did you start taking these medications: 05/12/2021    Which medication are you concerned about: omeprazole (PrilOSEC) 20 MG capsule    Who prescribed you this medication: DR IRENE    What are your concerns: THE PATIENT STATES THAT HE FEELS HE NO LONGER NEEDS THIS MEDICATION     How long have you been taking these medications: 1 MONTH    How long have you had these concerns: STATES THAT HE IS NO LONGER HAVING SYMPTOMS AND THEREFORE DOES NOT THINK HE NEEDS TO BE ON THE MEDICATION ANY LONGER.  IF ANY QUESTIONS PLEASE CALL THE PATIENT -[phone].

## 2021-06-21 NOTE — TELEPHONE ENCOUNTER
Please see the plan in last week's note.  He was instructed to slowly wean off the medication.  I would not advise him to stop it abruptly.

## 2021-06-24 RX ORDER — OMEPRAZOLE 20 MG/1
20 CAPSULE, DELAYED RELEASE ORAL DAILY
Qty: 30 CAPSULE | Refills: 1 | Status: SHIPPED | OUTPATIENT
Start: 2021-06-24 | End: 2021-08-30

## 2021-07-11 ENCOUNTER — PATIENT MESSAGE (OUTPATIENT)
Dept: INTERNAL MEDICINE | Facility: CLINIC | Age: 36
End: 2021-07-11

## 2021-07-12 ENCOUNTER — LAB (OUTPATIENT)
Dept: LAB | Facility: HOSPITAL | Age: 36
End: 2021-07-12

## 2021-07-12 ENCOUNTER — OFFICE VISIT (OUTPATIENT)
Dept: INTERNAL MEDICINE | Facility: CLINIC | Age: 36
End: 2021-07-12

## 2021-07-12 VITALS
BODY MASS INDEX: 26.05 KG/M2 | HEART RATE: 92 BPM | SYSTOLIC BLOOD PRESSURE: 144 MMHG | OXYGEN SATURATION: 96 % | TEMPERATURE: 98 F | WEIGHT: 152.6 LBS | DIASTOLIC BLOOD PRESSURE: 112 MMHG | HEIGHT: 64 IN

## 2021-07-12 DIAGNOSIS — E78.2 MIXED HYPERLIPIDEMIA: ICD-10-CM

## 2021-07-12 DIAGNOSIS — N18.2 CHRONIC KIDNEY DISEASE, STAGE 2 (MILD): ICD-10-CM

## 2021-07-12 DIAGNOSIS — Z11.59 ENCOUNTER FOR HEPATITIS C SCREENING TEST FOR LOW RISK PATIENT: ICD-10-CM

## 2021-07-12 DIAGNOSIS — F84.5 ASPERGER'S DISORDER: ICD-10-CM

## 2021-07-12 DIAGNOSIS — G25.81 RESTLESS LEG SYNDROME: ICD-10-CM

## 2021-07-12 DIAGNOSIS — R73.03 PREDIABETES: ICD-10-CM

## 2021-07-12 DIAGNOSIS — F33.1 MODERATE EPISODE OF RECURRENT MAJOR DEPRESSIVE DISORDER (HCC): ICD-10-CM

## 2021-07-12 DIAGNOSIS — Z00.00 ANNUAL PHYSICAL EXAM: Primary | ICD-10-CM

## 2021-07-12 DIAGNOSIS — F90.9 ATTENTION DEFICIT DISORDER OF CHILDHOOD WITH HYPERACTIVITY: ICD-10-CM

## 2021-07-12 PROBLEM — R00.0 TACHYCARDIA: Status: RESOLVED | Noted: 2019-06-18 | Resolved: 2021-07-12

## 2021-07-12 LAB
ALBUMIN SERPL-MCNC: 4.8 G/DL (ref 3.5–5.2)
ALBUMIN UR-MCNC: <1.2 MG/DL
ALBUMIN/GLOB SERPL: 1.4 G/DL
ALP SERPL-CCNC: 52 U/L (ref 39–117)
ALT SERPL W P-5'-P-CCNC: 34 U/L (ref 1–41)
ANION GAP SERPL CALCULATED.3IONS-SCNC: 11.1 MMOL/L (ref 5–15)
AST SERPL-CCNC: 26 U/L (ref 1–40)
BASOPHILS # BLD AUTO: 0.1 10*3/MM3 (ref 0–0.2)
BASOPHILS NFR BLD AUTO: 1.2 % (ref 0–1.5)
BILIRUB SERPL-MCNC: 0.4 MG/DL (ref 0–1.2)
BUN SERPL-MCNC: 10 MG/DL (ref 6–20)
BUN/CREAT SERPL: 7.5 (ref 7–25)
CALCIUM SPEC-SCNC: 9.8 MG/DL (ref 8.6–10.5)
CHLORIDE SERPL-SCNC: 99 MMOL/L (ref 98–107)
CHOLEST SERPL-MCNC: 231 MG/DL (ref 0–200)
CO2 SERPL-SCNC: 28.9 MMOL/L (ref 22–29)
CREAT SERPL-MCNC: 1.34 MG/DL (ref 0.76–1.27)
CREAT UR-MCNC: 109.6 MG/DL
DEPRECATED RDW RBC AUTO: 54.9 FL (ref 37–54)
EOSINOPHIL # BLD AUTO: 0.09 10*3/MM3 (ref 0–0.4)
EOSINOPHIL NFR BLD AUTO: 1.1 % (ref 0.3–6.2)
ERYTHROCYTE [DISTWIDTH] IN BLOOD BY AUTOMATED COUNT: 19.5 % (ref 12.3–15.4)
GFR SERPL CREATININE-BSD FRML MDRD: 61 ML/MIN/1.73
GLOBULIN UR ELPH-MCNC: 3.4 GM/DL
GLUCOSE SERPL-MCNC: 104 MG/DL (ref 65–99)
HBA1C MFR BLD: 5.53 % (ref 4.8–5.6)
HCT VFR BLD AUTO: 50.5 % (ref 37.5–51)
HCV AB SER DONR QL: NORMAL
HDLC SERPL-MCNC: 38 MG/DL (ref 40–60)
HGB BLD-MCNC: 16.3 G/DL (ref 13–17.7)
IMM GRANULOCYTES # BLD AUTO: 0.02 10*3/MM3 (ref 0–0.05)
IMM GRANULOCYTES NFR BLD AUTO: 0.2 % (ref 0–0.5)
LDLC SERPL CALC-MCNC: 154 MG/DL (ref 0–100)
LDLC/HDLC SERPL: 3.95 {RATIO}
LYMPHOCYTES # BLD AUTO: 3.12 10*3/MM3 (ref 0.7–3.1)
LYMPHOCYTES NFR BLD AUTO: 38 % (ref 19.6–45.3)
MCH RBC QN AUTO: 26.5 PG (ref 26.6–33)
MCHC RBC AUTO-ENTMCNC: 32.3 G/DL (ref 31.5–35.7)
MCV RBC AUTO: 82.2 FL (ref 79–97)
MICROALBUMIN/CREAT UR: NORMAL MG/G{CREAT}
MONOCYTES # BLD AUTO: 0.54 10*3/MM3 (ref 0.1–0.9)
MONOCYTES NFR BLD AUTO: 6.6 % (ref 5–12)
NEUTROPHILS NFR BLD AUTO: 4.35 10*3/MM3 (ref 1.7–7)
NEUTROPHILS NFR BLD AUTO: 52.9 % (ref 42.7–76)
NRBC BLD AUTO-RTO: 0 /100 WBC (ref 0–0.2)
PLATELET # BLD AUTO: 236 10*3/MM3 (ref 140–450)
PMV BLD AUTO: 11.1 FL (ref 6–12)
POTASSIUM SERPL-SCNC: 4.3 MMOL/L (ref 3.5–5.2)
PROT SERPL-MCNC: 8.2 G/DL (ref 6–8.5)
RBC # BLD AUTO: 6.14 10*6/MM3 (ref 4.14–5.8)
SODIUM SERPL-SCNC: 139 MMOL/L (ref 136–145)
TRIGL SERPL-MCNC: 214 MG/DL (ref 0–150)
VLDLC SERPL-MCNC: 39 MG/DL (ref 5–40)
WBC # BLD AUTO: 8.22 10*3/MM3 (ref 3.4–10.8)

## 2021-07-12 PROCEDURE — 80053 COMPREHEN METABOLIC PANEL: CPT

## 2021-07-12 PROCEDURE — 1126F AMNT PAIN NOTED NONE PRSNT: CPT | Performed by: INTERNAL MEDICINE

## 2021-07-12 PROCEDURE — G0439 PPPS, SUBSEQ VISIT: HCPCS | Performed by: INTERNAL MEDICINE

## 2021-07-12 PROCEDURE — 80061 LIPID PANEL: CPT

## 2021-07-12 PROCEDURE — 1159F MED LIST DOCD IN RCRD: CPT | Performed by: INTERNAL MEDICINE

## 2021-07-12 PROCEDURE — 82043 UR ALBUMIN QUANTITATIVE: CPT

## 2021-07-12 PROCEDURE — 86803 HEPATITIS C AB TEST: CPT

## 2021-07-12 PROCEDURE — 36415 COLL VENOUS BLD VENIPUNCTURE: CPT

## 2021-07-12 PROCEDURE — 85025 COMPLETE CBC W/AUTO DIFF WBC: CPT

## 2021-07-12 PROCEDURE — 99395 PREV VISIT EST AGE 18-39: CPT | Performed by: INTERNAL MEDICINE

## 2021-07-12 PROCEDURE — 83036 HEMOGLOBIN GLYCOSYLATED A1C: CPT

## 2021-07-12 PROCEDURE — 1170F FXNL STATUS ASSESSED: CPT | Performed by: INTERNAL MEDICINE

## 2021-07-12 PROCEDURE — 82570 ASSAY OF URINE CREATININE: CPT

## 2021-07-12 RX ORDER — MAGNESIUM OXIDE 400 MG/1
400 TABLET ORAL DAILY
COMMUNITY
End: 2021-07-12

## 2021-07-12 NOTE — TELEPHONE ENCOUNTER
From: Ever Freeman  To: Moe Gold MD  Sent: 7/11/2021 3:47 AM EDT  Subject: Non-Urgent Medical Question    Hey Dr Gold I just wanted to let you know I backed off magnesium pill for a night and waited for my system to clear before I took anymore.also realized I had forgot to take prilosec for a few days I took one yesterday morning and I will resume my dosing schedule.i will bring my new supplement bottles to my appointment tommorrow so you can see exactly what I'm taking.have a goodvday

## 2021-07-12 NOTE — PROGRESS NOTES
QUICK REFERENCE INFORMATION:  The ABCs of the Annual Wellness Visit    Subsequent Medicare Wellness Visit    HEALTH RISK ASSESSMENT    1985    Recent Hospitalizations:  No hospitalization(s) within the last year..        Current Medical Providers:  Patient Care Team:  Moe Gold MD as PCP - General  Moe Gold MD as PCP - Family Medicine        Smoking Status:  Social History     Tobacco Use   Smoking Status Former Smoker   • Packs/day: 1.00   • Years: 0.00   • Pack years: 0.00   • Types: Cigars   • Quit date:    • Years since quittin.5   Smokeless Tobacco Never Used   Tobacco Comment    not daily       Alcohol Consumption:  Social History     Substance and Sexual Activity   Alcohol Use Not Currently    Comment: Quit 1 mo. ago       Depression Screen:   PHQ-2/PHQ-9 Depression Screening 2021   Little interest or pleasure in doing things 0   Feeling down, depressed, or hopeless 0   Total Score 0       Health Habits and Functional and Cognitive Screening:  Functional & Cognitive Status 2021   Do you have difficulty preparing food and eating? No   Do you have difficulty bathing yourself, getting dressed or grooming yourself? No   Do you have difficulty using the toilet? No   Do you have difficulty moving around from place to place? No   Do you have trouble with steps or getting out of a bed or a chair? No   Current Diet Limited Junk Food   Dental Exam Not up to date   Eye Exam Not up to date   Exercise (times per week) 0 times per week   Current Exercises Include No Regular Exercise   Current Exercise Activities Include -   Do you need help using the phone?  No   Are you deaf or do you have serious difficulty hearing?  No   Do you need help with transportation? No   Do you need help shopping? No   Do you need help preparing meals?  No   Do you need help with housework?  No   Do you need help with laundry? No   Do you need help taking your medications? No   Do you need help  managing money? No   Do you ever drive or ride in a car without wearing a seat belt? No   Have you felt unusual stress, anger or loneliness in the last month? No   Who do you live with? Alone   If you need help, do you have trouble finding someone available to you? No   Have you been bothered in the last four weeks by sexual problems? No   Do you have difficulty concentrating, remembering or making decisions? No       Fall Risk Screen:  STEADI Fall Risk Assessment has not been completed.    ACE III MINI        Does the patient have evidence of cognitive impairment? No    Aspirin use counseling: Does not need ASA (and currently is not on it)    Recent Lab Results:  CMP:  Lab Results   Component Value Date    BUN 15 07/09/2020    CREATININE 1.50 (H) 07/09/2020    EGFRIFNONA 54 (L) 07/09/2020    BCR 10.0 07/09/2020     07/09/2020    K 3.9 07/09/2020    CO2 29.8 (H) 07/09/2020    CALCIUM 10.1 07/09/2020    ALBUMIN 4.70 07/09/2020    BILITOT 0.3 07/09/2020    ALKPHOS 43 07/09/2020    AST 26 07/09/2020    ALT 34 07/09/2020     HbA1c:  Lab Results   Component Value Date    HGBA1C 5.76 (H) 07/09/2020    HGBA1C 5.80 (H) 07/02/2019     Microalbumin:  Lab Results   Component Value Date    MICROALBUR <1.2 07/09/2020     Lipid Panel  Lab Results   Component Value Date    CHOL 216 (H) 07/09/2020    TRIG 151 (H) 07/09/2020    HDL 40 07/09/2020     (H) 07/09/2020    AST 26 07/09/2020    ALT 34 07/09/2020       Visual Acuity:  No exam data present    Age-appropriate Screening Schedule:  Refer to the list below for future screening recommendations based on patient's age, sex and/or medical conditions. Orders for these recommended tests are listed in the plan section. The patient has been provided with a written plan.    Health Maintenance   Topic Date Due   • TDAP/TD VACCINES (1 - Tdap) Never done   • LIPID PANEL  07/09/2021   • INFLUENZA VACCINE  08/01/2021        Subjective   History of Present Illness    Ever  Low Freeman is a 35 y.o. male who presents for a Subsequent Wellness Visit.    CHRONIC CONDITIONS    The following portions of the patient's history were reviewed and updated as appropriate: allergies, current medications, past family history, past medical history, past social history, past surgical history and problem list.    Outpatient Medications Prior to Visit   Medication Sig Dispense Refill   • busPIRone (BUSPAR) 30 MG tablet Take 45 mg by mouth Every 12 (Twelve) Hours.     • clonazePAM (KLONOPIN) 1 MG tablet Take 0.5 mg by mouth As Needed.     • fluvoxaMINE (LUVOX) 100 MG tablet Take 150 mg by mouth 2 (Two) Times a Day.     • guanFACINE (TENEX) 1 MG tablet Take  by mouth Every 12 (Twelve) Hours.     • melatonin 5 MG tablet tablet Take 10 mg by mouth At Night As Needed.     • omeprazole (PrilOSEC) 20 MG capsule Take 1 capsule by mouth Daily. 30 capsule 1   • ziprasidone (GEODON) 80 MG capsule Take 80 mg by mouth 2 (Two) Times a Day With Meals.     • magnesium oxide (MAG-OX) 400 MG tablet Take 400 mg by mouth Daily.       No facility-administered medications prior to visit.       Patient Active Problem List   Diagnosis   • Anxiety state   • Obsessive compulsive disorder   • Annual physical exam   • Moderate episode of recurrent major depressive disorder (CMS/HCC)   • Chronic kidney disease, stage 2 (mild)   • Hyperlipidemia   • Allergic rhinitis   • Asperger's disorder   • Prediabetes   • Attention deficit disorder of childhood with hyperactivity       Advance Care Planning:  ACP discussion was held with the patient during this visit. Patient has an advance directive in EMR which is still valid.     Identification of Risk Factors:  Risk factors include: Advance Directive Discussion.    Review of Systems   Constitutional: Negative for chills, fatigue and fever.   HENT: Negative for congestion, ear pain and sinus pressure.    Respiratory: Negative for cough, chest tightness, shortness of breath and  wheezing.    Cardiovascular: Negative for chest pain and palpitations.   Gastrointestinal: Negative for abdominal pain, blood in stool and constipation.   Skin: Negative for color change.   Allergic/Immunologic: Negative for environmental allergies.   Neurological: Negative for dizziness, speech difficulty and headaches.   Psychiatric/Behavioral: Negative for confusion. The patient is not nervous/anxious.        Compared to one year ago, the patient feels his physical health is the same.  Compared to one year ago, the patient feels his mental health is the same.    Objective     Physical Exam  Vitals and nursing note reviewed.   Constitutional:       Appearance: He is well-developed.   HENT:      Head: Normocephalic and atraumatic.      Right Ear: External ear normal.      Left Ear: External ear normal.      Nose: Nose normal.      Mouth/Throat:      Pharynx: No oropharyngeal exudate.   Eyes:      Conjunctiva/sclera: Conjunctivae normal.      Pupils: Pupils are equal, round, and reactive to light.   Neck:      Thyroid: No thyromegaly.      Vascular: No JVD.   Cardiovascular:      Rate and Rhythm: Normal rate and regular rhythm.      Heart sounds: Normal heart sounds. No murmur heard.   No friction rub. No gallop.    Pulmonary:      Effort: Pulmonary effort is normal. No respiratory distress.      Breath sounds: Normal breath sounds. No wheezing or rales.   Chest:      Chest wall: No tenderness.   Abdominal:      General: Bowel sounds are normal. There is no distension.      Palpations: Abdomen is soft. There is no mass.      Tenderness: There is no abdominal tenderness. There is no guarding or rebound.      Hernia: No hernia is present.   Musculoskeletal:         General: No tenderness. Normal range of motion.      Cervical back: Normal range of motion and neck supple.   Lymphadenopathy:      Cervical: No cervical adenopathy.   Skin:     General: Skin is warm and dry.      Findings: No erythema or rash.  "  Neurological:      Mental Status: He is alert and oriented to person, place, and time.      Cranial Nerves: No cranial nerve deficit.      Sensory: No sensory deficit.      Motor: No abnormal muscle tone.      Coordination: Coordination normal.      Deep Tendon Reflexes: Reflexes normal.   Psychiatric:         Behavior: Behavior normal.         Thought Content: Thought content normal.         Judgment: Judgment normal.          Procedures     Vitals:    07/12/21 0921   BP: (!) 144/112   BP Location: Left arm   Patient Position: Sitting   Cuff Size: Adult   Pulse: 92   Temp: 98 °F (36.7 °C)   TempSrc: Temporal   SpO2: 96%   Weight: 69.2 kg (152 lb 9.6 oz)   Height: 162 cm (63.78\")   PainSc: 0-No pain       Patient's Body mass index is 26.37 kg/m². indicating that he is overweight (BMI 25-29.9). Obesity-related health conditions include the following: dyslipidemias. Obesity is unchanged. BMI is is above average; BMI management plan is completed. We discussed portion control, increasing exercise and joining a fitness center or start home based exercise program..      Assessment/Plan   Problem List Items Addressed This Visit        Cardiac and Vasculature    Hyperlipidemia    Relevant Orders    Comprehensive Metabolic Panel    Lipid Panel       Endocrine and Metabolic    Prediabetes    Relevant Orders    Hemoglobin A1c       Genitourinary and Reproductive     Chronic kidney disease, stage 2 (mild)    Relevant Orders    CBC & Differential    Microalbumin / Creatinine Urine Ratio - Urine, Clean Catch       Health Encounters    Annual physical exam - Primary       Mental Health    Moderate episode of recurrent major depressive disorder (CMS/HCC)    Relevant Medications    ziprasidone (GEODON) 80 MG capsule    busPIRone (BUSPAR) 30 MG tablet    fluvoxaMINE (LUVOX) 100 MG tablet    Attention deficit disorder of childhood with hyperactivity    Relevant Medications    ziprasidone (GEODON) 80 MG capsule    busPIRone (BUSPAR) " 30 MG tablet    fluvoxaMINE (LUVOX) 100 MG tablet       Neuro    Asperger's disorder    Relevant Medications    ziprasidone (GEODON) 80 MG capsule    busPIRone (BUSPAR) 30 MG tablet    fluvoxaMINE (LUVOX) 100 MG tablet      Other Visit Diagnoses     Encounter for hepatitis C screening test for low risk patient        Relevant Orders    Hepatitis C antibody    Restless leg syndrome            Patient Self-Management and Personalized Health Advice  The patient has been provided with information about: diet, exercise and weight management and preventive services including:   · Annual Wellness Visit (AWV).    Outpatient Encounter Medications as of 7/12/2021   Medication Sig Dispense Refill   • busPIRone (BUSPAR) 30 MG tablet Take 45 mg by mouth Every 12 (Twelve) Hours.     • clonazePAM (KLONOPIN) 1 MG tablet Take 0.5 mg by mouth As Needed.     • fluvoxaMINE (LUVOX) 100 MG tablet Take 150 mg by mouth 2 (Two) Times a Day.     • guanFACINE (TENEX) 1 MG tablet Take  by mouth Every 12 (Twelve) Hours.     • melatonin 5 MG tablet tablet Take 10 mg by mouth At Night As Needed.     • omeprazole (PrilOSEC) 20 MG capsule Take 1 capsule by mouth Daily. 30 capsule 1   • ziprasidone (GEODON) 80 MG capsule Take 80 mg by mouth 2 (Two) Times a Day With Meals.     • [DISCONTINUED] magnesium oxide (MAG-OX) 400 MG tablet Take 400 mg by mouth Daily.       No facility-administered encounter medications on file as of 7/12/2021.     Plan    Overall he remains in good health.  I encouraged him to stay active.  He is fully vaccinated against COVID-19.  His blood pressure was uncharacteristically high today but he states that he had an argument with his neighbor before he came to the office.  He will monitor and forward readings.  Recent GI distress seems to have resolved with Prilosec.  He is instructed to wean over the next month.    Lipid panel is pending, the treatment remains healthy diet and avoidance of weight gain.    A1c is pending, see  dietary instructions above.    GFR is pending, the treatment remains avoidance of NSAIDs.    Restless legs versus nighttime leg cramps.  Unfortunately magnesium causes diarrhea so I suggested he discontinue that.    He will continue follow-up with his psychiatrist for his depression, anxiety and Asperger's.    Reviewed use of high risk medication in the elderly: yes  Reviewed for potential of harmful drug interactions in the elderly: yes    Follow Up:  Return in about 6 months (around 1/12/2022) for follow up.     There are no Patient Instructions on file for this visit.    An After Visit Summary and PPPS with all of these plans were given to the patient.

## 2021-07-15 ENCOUNTER — TELEPHONE (OUTPATIENT)
Dept: INTERNAL MEDICINE | Facility: CLINIC | Age: 36
End: 2021-07-15

## 2021-07-15 NOTE — TELEPHONE ENCOUNTER
Caller: Ever Freeman    Relationship to patient: Self    Best call back number:     Patient is needing: PATIENT WANTED TO UPDATE DR IRENE THAT HE DID GET SOME SLEEP.

## 2021-08-30 RX ORDER — OMEPRAZOLE 20 MG/1
CAPSULE, DELAYED RELEASE ORAL
Qty: 30 CAPSULE | Refills: 1 | Status: SHIPPED | OUTPATIENT
Start: 2021-08-30 | End: 2021-11-01

## 2021-09-17 ENCOUNTER — PATIENT MESSAGE (OUTPATIENT)
Dept: INTERNAL MEDICINE | Facility: CLINIC | Age: 36
End: 2021-09-17

## 2021-09-17 NOTE — TELEPHONE ENCOUNTER
From: Ever Freeman  To: Moe Gold MD  Sent: 9/17/2021 10:10 AM EDT  Subject: Non-Urgent Medical Question    Hey Dr Gold I took my blood pressure yesterday and it was 114/83 and my pulse was 99 beats a minute.have a good weekend

## 2021-11-01 RX ORDER — OMEPRAZOLE 20 MG/1
CAPSULE, DELAYED RELEASE ORAL
Qty: 30 CAPSULE | Refills: 1 | Status: SHIPPED | OUTPATIENT
Start: 2021-11-01 | End: 2022-01-10

## 2022-01-10 RX ORDER — OMEPRAZOLE 20 MG/1
CAPSULE, DELAYED RELEASE ORAL
Qty: 30 CAPSULE | Refills: 1 | Status: SHIPPED | OUTPATIENT
Start: 2022-01-10 | End: 2022-03-07

## 2022-01-12 ENCOUNTER — OFFICE VISIT (OUTPATIENT)
Dept: INTERNAL MEDICINE | Facility: CLINIC | Age: 37
End: 2022-01-12

## 2022-01-12 ENCOUNTER — LAB (OUTPATIENT)
Dept: LAB | Facility: HOSPITAL | Age: 37
End: 2022-01-12

## 2022-01-12 VITALS
BODY MASS INDEX: 26.09 KG/M2 | WEIGHT: 152.8 LBS | TEMPERATURE: 97.7 F | HEART RATE: 74 BPM | SYSTOLIC BLOOD PRESSURE: 116 MMHG | DIASTOLIC BLOOD PRESSURE: 92 MMHG | OXYGEN SATURATION: 96 % | HEIGHT: 64 IN

## 2022-01-12 DIAGNOSIS — I10 PRIMARY HYPERTENSION: Primary | ICD-10-CM

## 2022-01-12 DIAGNOSIS — E78.2 MIXED HYPERLIPIDEMIA: ICD-10-CM

## 2022-01-12 DIAGNOSIS — R73.03 PREDIABETES: ICD-10-CM

## 2022-01-12 LAB
ALBUMIN SERPL-MCNC: 4.9 G/DL (ref 3.5–5.2)
ALBUMIN/GLOB SERPL: 1.5 G/DL
ALP SERPL-CCNC: 55 U/L (ref 39–117)
ALT SERPL W P-5'-P-CCNC: 25 U/L (ref 1–41)
ANION GAP SERPL CALCULATED.3IONS-SCNC: 11.3 MMOL/L (ref 5–15)
AST SERPL-CCNC: 24 U/L (ref 1–40)
BILIRUB SERPL-MCNC: 0.3 MG/DL (ref 0–1.2)
BUN SERPL-MCNC: 11 MG/DL (ref 6–20)
BUN/CREAT SERPL: 6.8 (ref 7–25)
CALCIUM SPEC-SCNC: 10.1 MG/DL (ref 8.6–10.5)
CHLORIDE SERPL-SCNC: 100 MMOL/L (ref 98–107)
CHOLEST SERPL-MCNC: 194 MG/DL (ref 0–200)
CO2 SERPL-SCNC: 29.7 MMOL/L (ref 22–29)
CREAT SERPL-MCNC: 1.62 MG/DL (ref 0.76–1.27)
GFR SERPL CREATININE-BSD FRML MDRD: 48 ML/MIN/1.73
GLOBULIN UR ELPH-MCNC: 3.3 GM/DL
GLUCOSE SERPL-MCNC: 102 MG/DL (ref 65–99)
HBA1C MFR BLD: 6.24 % (ref 4.8–5.6)
HDLC SERPL-MCNC: 39 MG/DL (ref 40–60)
LDLC SERPL CALC-MCNC: 133 MG/DL (ref 0–100)
LDLC/HDLC SERPL: 3.34 {RATIO}
POTASSIUM SERPL-SCNC: 4.3 MMOL/L (ref 3.5–5.2)
PROT SERPL-MCNC: 8.2 G/DL (ref 6–8.5)
SODIUM SERPL-SCNC: 141 MMOL/L (ref 136–145)
TRIGL SERPL-MCNC: 124 MG/DL (ref 0–150)
VLDLC SERPL-MCNC: 22 MG/DL (ref 5–40)

## 2022-01-12 PROCEDURE — 83036 HEMOGLOBIN GLYCOSYLATED A1C: CPT

## 2022-01-12 PROCEDURE — 99214 OFFICE O/P EST MOD 30 MIN: CPT | Performed by: INTERNAL MEDICINE

## 2022-01-12 PROCEDURE — 80053 COMPREHEN METABOLIC PANEL: CPT

## 2022-01-12 PROCEDURE — 80061 LIPID PANEL: CPT

## 2022-01-12 NOTE — PROGRESS NOTES
[facility]     [facility]  1985   [id number]      Patient Care Team:  Moe Gold MD as PCP - General  Moe Gold MD as PCP - Family Medicine    Chief Complaint::   Chief Complaint   Patient presents with   • Hyperlipidemia     6 mo. f/u   • Depression   • hearing decreased        HPI  ***    Chronic Conditions:  ***    Patient Active Problem List   Diagnosis   • Anxiety state   • Obsessive compulsive disorder   • Annual physical exam   • Moderate episode of recurrent major depressive disorder (HCC)   • Chronic kidney disease, stage 2 (mild)   • Hyperlipidemia   • Allergic rhinitis   • Asperger's disorder   • Prediabetes   • Attention deficit disorder of childhood with hyperactivity        Past Medical History:   Diagnosis Date   • Alcohol abuse    • Anxiety    • Asperger's syndrome    • Neurocardiogenic syncope    • OCD (obsessive compulsive disorder)    • Severe hearing loss     Hearing loss, severe right sensorineural       Past Surgical History:   Procedure Laterality Date   • CHOLECYSTECTOMY  2015   • TYMPANOSTOMY  1986       History reviewed. No pertinent family history.    Social History     Socioeconomic History   • Marital status: Single   Tobacco Use   • Smoking status: Former Smoker     Packs/day: 1.00     Years: 0.00     Pack years: 0.00     Types: Cigars     Quit date: 2020     Years since quittin.0   • Smokeless tobacco: Never Used   • Tobacco comment: not daily   Vaping Use   • Vaping Use: Never used   Substance and Sexual Activity   • Alcohol use: Not Currently     Comment: Quit 1 mo. ago   • Drug use: No   • Sexual activity: Defer       Allergies   Allergen Reactions   • Demerol [Meperidine] Hives         Current Outpatient Medications:   •  busPIRone (BUSPAR) 30 MG tablet, Take 45 mg by mouth Every 12 (Twelve) Hours., Disp: , Rfl:   •  clonazePAM (KLONOPIN) 1 MG tablet, Take 0.5 mg by mouth As Needed., Disp: , Rfl:   •  fluvoxaMINE  "(LUVOX) 100 MG tablet, Take 150 mg by mouth 2 (Two) Times a Day., Disp: , Rfl:   •  guanFACINE (TENEX) 1 MG tablet, Take  by mouth Every 12 (Twelve) Hours., Disp: , Rfl:   •  melatonin 5 MG tablet tablet, Take 10 mg by mouth At Night As Needed., Disp: , Rfl:   •  omeprazole (priLOSEC) 20 MG capsule, TAKE ONE CAPSULE BY MOUTH DAILY (Patient taking differently: PRN), Disp: 30 capsule, Rfl: 1  •  ziprasidone (GEODON) 80 MG capsule, Take 80 mg by mouth 2 (Two) Times a Day With Meals., Disp: , Rfl:     Review of Systems     Vital Signs  Vitals:    01/12/22 0959   BP: 116/92   BP Location: Left arm   Patient Position: Sitting   Cuff Size: Adult   Pulse: 74   Temp: 97.7 °F (36.5 °C)   TempSrc: Temporal   SpO2: 96%   Weight: 69.3 kg (152 lb 12.8 oz)   Height: 162 cm (63.78\")   PainSc: 0-No pain       Physical Exam     Procedures    ACE III MINI             Assessment/Plan:    Diagnoses and all orders for this visit:    1. Primary hypertension (Primary)    2. Mixed hyperlipidemia  -     Comprehensive Metabolic Panel; Future  -     Lipid Panel; Future    3. Prediabetes  -     Hemoglobin A1c; Future          Plan of care reviewed with patient at the conclusion of today's visit. Education was provided regarding diagnosis, management, and any prescribed or recommended OTC medications.Patient verbalizes understanding of and agreement with management plan.         Moe Gold MD           "

## 2022-01-12 NOTE — PROGRESS NOTES
"[facility]     [facility] / [facility]  1985   [id number]      Patient Care Team:  Moe Gold MD as PCP - General  Moe Gold MD as PCP - Family Medicine    Chief Complaint::   Chief Complaint   Patient presents with   • Hyperlipidemia     6 mo. f/u   • Depression   • hearing decreased        HPI    The patient has consented to being recorded using [employer]HANE.    The patient presents today in follow-up regarding his hypertension, hyperlipidemia, and prediabetes. He continues to see his mental health professionals for depression, ADD, anxiety, and Aspergers.     He reports he is feeling \"pretty good\". The patient notes his mother was informed her breast cancer has returned and he was depressed regarding this for approximately 2 days; \"I did not really want to do anything and laid low\" on 01/11/2022. Otherwise, he states he feels good and his activity level is good. He notes that he sees both a psychiatrist and a psychologist.     The patient reports some difficulty hearing. He states that he presented to a \"hearing store\" where he had a hearing test performed and he was informed his left ear was lower than what it was previously. He adds he was also informed he would likely need hearing aids in the future. The patient notes he has an appointment scheduled for 02/09/2022.     He denies chest pain, or shortness of breath. The patient also denies allergy symptoms, or congestion. He states that he will intermittently sneeze.    Chronic Conditions:  Hypertension, hyperlipidemia, and prediabetes. He continues to see his mental health professionals for depression, ADD, anxiety, and Aspergers.     Patient Active Problem List   Diagnosis   • Anxiety state   • Obsessive compulsive disorder   • Annual physical exam   • Moderate episode of recurrent major depressive disorder (HCC)   • Chronic kidney disease, stage 2 (mild)   • Hyperlipidemia   • Allergic rhinitis   • Asperger's disorder   • " Prediabetes   • Attention deficit disorder of childhood with hyperactivity        Past Medical History:   Diagnosis Date   • Alcohol abuse    • Anxiety    • Asperger's syndrome    • Neurocardiogenic syncope    • OCD (obsessive compulsive disorder)    • Severe hearing loss     Hearing loss, severe right sensorineural       Past Surgical History:   Procedure Laterality Date   • CHOLECYSTECTOMY  2015   • TYMPANOSTOMY  1986       No family history on file.    Social History     Socioeconomic History   • Marital status: Single   Tobacco Use   • Smoking status: Former Smoker     Packs/day: 1.00     Years: 0.00     Pack years: 0.00     Types: Cigars     Quit date:      Years since quittin.0   • Smokeless tobacco: Never Used   • Tobacco comment: not daily   Vaping Use   • Vaping Use: Never used   Substance and Sexual Activity   • Alcohol use: Not Currently     Comment: Quit 1 mo. ago   • Drug use: No   • Sexual activity: Defer       Allergies   Allergen Reactions   • Demerol [Meperidine] Hives         Current Outpatient Medications:   •  busPIRone (BUSPAR) 30 MG tablet, Take 45 mg by mouth Every 12 (Twelve) Hours., Disp: , Rfl:   •  clonazePAM (KLONOPIN) 1 MG tablet, Take 0.5 mg by mouth As Needed., Disp: , Rfl:   •  fluvoxaMINE (LUVOX) 100 MG tablet, Take 150 mg by mouth 2 (Two) Times a Day., Disp: , Rfl:   •  guanFACINE (TENEX) 1 MG tablet, Take  by mouth Every 12 (Twelve) Hours., Disp: , Rfl:   •  melatonin 5 MG tablet tablet, Take 10 mg by mouth At Night As Needed., Disp: , Rfl:   •  omeprazole (priLOSEC) 20 MG capsule, TAKE ONE CAPSULE BY MOUTH DAILY (Patient taking differently: PRN), Disp: 30 capsule, Rfl: 1  •  ziprasidone (GEODON) 80 MG capsule, Take 80 mg by mouth 2 (Two) Times a Day With Meals., Disp: , Rfl:     Review of Systems   Constitutional: Negative for chills, fatigue and fever.   HENT: Negative for congestion, ear pain and sinus pressure.    Respiratory: Negative for cough, chest tightness,  "shortness of breath and wheezing.    Cardiovascular: Negative for chest pain and palpitations.   Gastrointestinal: Negative for abdominal pain, blood in stool and constipation.   Skin: Negative for color change.   Allergic/Immunologic: Negative for environmental allergies.   Neurological: Negative for dizziness, speech difficulty and headache.   Psychiatric/Behavioral: Negative for decreased concentration. The patient is not nervous/anxious.         Vital Signs  Vitals:    01/12/22 0959   BP: 116/92   BP Location: Left arm   Patient Position: Sitting   Cuff Size: Adult   Pulse: 74   Temp: 97.7 °F (36.5 °C)   TempSrc: Temporal   SpO2: 96%   Weight: 69.3 kg (152 lb 12.8 oz)   Height: 162 cm (63.78\")   PainSc: 0-No pain       Physical Exam  Vitals reviewed.   Constitutional:       Appearance: He is well-developed.   HENT:      Head: Normocephalic and atraumatic.   Cardiovascular:      Rate and Rhythm: Normal rate and regular rhythm.      Heart sounds: Normal heart sounds. No murmur heard.      Pulmonary:      Effort: Pulmonary effort is normal.      Breath sounds: Normal breath sounds.   Neurological:      Mental Status: He is alert and oriented to person, place, and time.          Procedures    ACE III MINI             Assessment/Plan:    Diagnoses and all orders for this visit:    1. Primary hypertension (Primary)        - His blood pressure appears to be well controlled with nonpharmacologic therapy. Once again I have informed him he should only take his blood pressure in a calm, rested state.    2. Mixed hyperlipidemia  -     Comprehensive Metabolic Panel; Future  -     Lipid Panel; Future  - His LDL cholesterol has gradually increased over the past 2 years. His lipid panel is pending today. If it has again increased slightly he will have the next 6 months to lower this before a statin is added.     3. Prediabetes  -     Hemoglobin A1c; Future  -  The treatment remains healthy diet and avoidance of weight gain. "     He will follow-up in 6 months.      Plan of care reviewed with patient at the conclusion of today's visit. Education was provided regarding diagnosis, management, and any prescribed or recommended OTC medications.Patient verbalizes understanding of and agreement with management plan.         Moe Gold MD      Transcribed from ambient dictation for Moe Gold MD by Ghazala Reddy.  01/12/22   11:46 EST    Patient verbalized consent to the visit recording.

## 2022-01-21 ENCOUNTER — PATIENT MESSAGE (OUTPATIENT)
Dept: INTERNAL MEDICINE | Facility: CLINIC | Age: 37
End: 2022-01-21

## 2022-01-21 NOTE — TELEPHONE ENCOUNTER
From: Ever Freeman  To: Moe Gold MD  Sent: 1/21/2022 1:27 PM EST  Subject: Covid 19    Dr Gold one of my apartment neighbors tested positive for covid 19.i did ordered the four free at home test kits.but I really am limiting my activity to avoid catching it.my neighbor was doubled vaxxed and boosted and she still caught it.im just letting you know .

## 2022-03-07 RX ORDER — OMEPRAZOLE 20 MG/1
CAPSULE, DELAYED RELEASE ORAL
Qty: 30 CAPSULE | Refills: 1 | Status: SHIPPED | OUTPATIENT
Start: 2022-03-07 | End: 2022-05-19

## 2022-04-06 DIAGNOSIS — L65.9 HAIR LOSS: Primary | ICD-10-CM

## 2022-05-19 RX ORDER — OMEPRAZOLE 20 MG/1
CAPSULE, DELAYED RELEASE ORAL
Qty: 30 CAPSULE | Refills: 1 | Status: SHIPPED | OUTPATIENT
Start: 2022-05-19 | End: 2022-07-18

## 2022-05-26 ENCOUNTER — TELEPHONE (OUTPATIENT)
Dept: INTERNAL MEDICINE | Facility: CLINIC | Age: 37
End: 2022-05-26

## 2022-05-26 NOTE — TELEPHONE ENCOUNTER
Caller: Ever Freeman    Relationship to patient: Self    Best call back number:[phone]    Patient is needing: TO INFORM DR IRENE THAT HIS BLOOD PRESSURE READING TODAY IS 98/68 AND PULSE IS 88

## 2022-06-17 ENCOUNTER — TELEPHONE (OUTPATIENT)
Dept: INTERNAL MEDICINE | Facility: CLINIC | Age: 37
End: 2022-06-17

## 2022-06-17 NOTE — TELEPHONE ENCOUNTER
Caller: Ever Freeman    Relationship to patient: Self    Best call back number: [phone]    Patient is needing: PATIENT NEEDING TO KNOW WHEN HIS LAST EKG WAS.

## 2022-07-18 RX ORDER — OMEPRAZOLE 20 MG/1
CAPSULE, DELAYED RELEASE ORAL
Qty: 30 CAPSULE | Refills: 1 | Status: SHIPPED | OUTPATIENT
Start: 2022-07-18 | End: 2022-09-19

## 2022-07-22 ENCOUNTER — OFFICE VISIT (OUTPATIENT)
Dept: INTERNAL MEDICINE | Facility: CLINIC | Age: 37
End: 2022-07-22

## 2022-07-22 ENCOUNTER — LAB (OUTPATIENT)
Dept: LAB | Facility: HOSPITAL | Age: 37
End: 2022-07-22

## 2022-07-22 VITALS
SYSTOLIC BLOOD PRESSURE: 107 MMHG | WEIGHT: 155.2 LBS | BODY MASS INDEX: 26.5 KG/M2 | HEIGHT: 64 IN | TEMPERATURE: 98.2 F | DIASTOLIC BLOOD PRESSURE: 60 MMHG | HEART RATE: 72 BPM

## 2022-07-22 DIAGNOSIS — R73.03 PREDIABETES: ICD-10-CM

## 2022-07-22 DIAGNOSIS — F84.5 ASPERGER'S DISORDER: ICD-10-CM

## 2022-07-22 DIAGNOSIS — F90.9 ATTENTION DEFICIT DISORDER OF CHILDHOOD WITH HYPERACTIVITY: ICD-10-CM

## 2022-07-22 DIAGNOSIS — E78.2 MIXED HYPERLIPIDEMIA: ICD-10-CM

## 2022-07-22 DIAGNOSIS — F33.1 MODERATE EPISODE OF RECURRENT MAJOR DEPRESSIVE DISORDER: ICD-10-CM

## 2022-07-22 DIAGNOSIS — Z00.00 ANNUAL PHYSICAL EXAM: Primary | ICD-10-CM

## 2022-07-22 DIAGNOSIS — I10 PRIMARY HYPERTENSION: ICD-10-CM

## 2022-07-22 LAB
ALBUMIN SERPL-MCNC: 4.6 G/DL (ref 3.5–5.2)
ALBUMIN/GLOB SERPL: 1.6 G/DL
ALP SERPL-CCNC: 49 U/L (ref 39–117)
ALT SERPL W P-5'-P-CCNC: 49 U/L (ref 1–41)
ANION GAP SERPL CALCULATED.3IONS-SCNC: 14.3 MMOL/L (ref 5–15)
AST SERPL-CCNC: 25 U/L (ref 1–40)
BASOPHILS # BLD AUTO: 0.07 10*3/MM3 (ref 0–0.2)
BASOPHILS NFR BLD AUTO: 0.7 % (ref 0–1.5)
BILIRUB SERPL-MCNC: 0.5 MG/DL (ref 0–1.2)
BUN SERPL-MCNC: 12 MG/DL (ref 6–20)
BUN/CREAT SERPL: 8.2 (ref 7–25)
CALCIUM SPEC-SCNC: 9 MG/DL (ref 8.6–10.5)
CHLORIDE SERPL-SCNC: 101 MMOL/L (ref 98–107)
CHOLEST SERPL-MCNC: 229 MG/DL (ref 0–200)
CO2 SERPL-SCNC: 26.7 MMOL/L (ref 22–29)
CREAT SERPL-MCNC: 1.47 MG/DL (ref 0.76–1.27)
DEPRECATED RDW RBC AUTO: 38.1 FL (ref 37–54)
EGFRCR SERPLBLD CKD-EPI 2021: 63 ML/MIN/1.73
EOSINOPHIL # BLD AUTO: 0.23 10*3/MM3 (ref 0–0.4)
EOSINOPHIL NFR BLD AUTO: 2.2 % (ref 0.3–6.2)
ERYTHROCYTE [DISTWIDTH] IN BLOOD BY AUTOMATED COUNT: 12.6 % (ref 12.3–15.4)
GLOBULIN UR ELPH-MCNC: 2.8 GM/DL
GLUCOSE SERPL-MCNC: 102 MG/DL (ref 65–99)
HBA1C MFR BLD: 6.1 % (ref 4.8–5.6)
HCT VFR BLD AUTO: 46 % (ref 37.5–51)
HDLC SERPL-MCNC: 41 MG/DL (ref 40–60)
HGB BLD-MCNC: 15.3 G/DL (ref 13–17.7)
IMM GRANULOCYTES # BLD AUTO: 0.04 10*3/MM3 (ref 0–0.05)
IMM GRANULOCYTES NFR BLD AUTO: 0.4 % (ref 0–0.5)
LDLC SERPL CALC-MCNC: 155 MG/DL (ref 0–100)
LDLC/HDLC SERPL: 3.71 {RATIO}
LYMPHOCYTES # BLD AUTO: 5.11 10*3/MM3 (ref 0.7–3.1)
LYMPHOCYTES NFR BLD AUTO: 48 % (ref 19.6–45.3)
MCH RBC QN AUTO: 27.9 PG (ref 26.6–33)
MCHC RBC AUTO-ENTMCNC: 33.3 G/DL (ref 31.5–35.7)
MCV RBC AUTO: 83.8 FL (ref 79–97)
MONOCYTES # BLD AUTO: 0.77 10*3/MM3 (ref 0.1–0.9)
MONOCYTES NFR BLD AUTO: 7.2 % (ref 5–12)
NEUTROPHILS NFR BLD AUTO: 4.43 10*3/MM3 (ref 1.7–7)
NEUTROPHILS NFR BLD AUTO: 41.5 % (ref 42.7–76)
NRBC BLD AUTO-RTO: 0 /100 WBC (ref 0–0.2)
PLATELET # BLD AUTO: 234 10*3/MM3 (ref 140–450)
PMV BLD AUTO: 10.5 FL (ref 6–12)
POTASSIUM SERPL-SCNC: 4 MMOL/L (ref 3.5–5.2)
PROT SERPL-MCNC: 7.4 G/DL (ref 6–8.5)
RBC # BLD AUTO: 5.49 10*6/MM3 (ref 4.14–5.8)
SODIUM SERPL-SCNC: 142 MMOL/L (ref 136–145)
TRIGL SERPL-MCNC: 180 MG/DL (ref 0–150)
VLDLC SERPL-MCNC: 33 MG/DL (ref 5–40)
WBC NRBC COR # BLD: 10.65 10*3/MM3 (ref 3.4–10.8)

## 2022-07-22 PROCEDURE — 85025 COMPLETE CBC W/AUTO DIFF WBC: CPT

## 2022-07-22 PROCEDURE — 80053 COMPREHEN METABOLIC PANEL: CPT

## 2022-07-22 PROCEDURE — 1170F FXNL STATUS ASSESSED: CPT | Performed by: INTERNAL MEDICINE

## 2022-07-22 PROCEDURE — 36415 COLL VENOUS BLD VENIPUNCTURE: CPT

## 2022-07-22 PROCEDURE — 82172 ASSAY OF APOLIPOPROTEIN: CPT

## 2022-07-22 PROCEDURE — 99395 PREV VISIT EST AGE 18-39: CPT | Performed by: INTERNAL MEDICINE

## 2022-07-22 PROCEDURE — G0439 PPPS, SUBSEQ VISIT: HCPCS | Performed by: INTERNAL MEDICINE

## 2022-07-22 PROCEDURE — 1159F MED LIST DOCD IN RCRD: CPT | Performed by: INTERNAL MEDICINE

## 2022-07-22 PROCEDURE — 80061 LIPID PANEL: CPT

## 2022-07-22 PROCEDURE — 83036 HEMOGLOBIN GLYCOSYLATED A1C: CPT

## 2022-07-22 RX ORDER — FLUOCINONIDE TOPICAL SOLUTION USP, 0.05% 0.5 MG/ML
1 SOLUTION TOPICAL DAILY PRN
COMMUNITY
Start: 2022-06-28

## 2022-07-22 RX ORDER — HYDROXYZINE 50 MG/1
1 TABLET, FILM COATED ORAL DAILY PRN
COMMUNITY
Start: 2022-06-25

## 2022-07-22 NOTE — PROGRESS NOTES
QUICK REFERENCE INFORMATION:  The ABCs of the Annual Wellness Visit    Subsequent Medicare Wellness Visit    HEALTH RISK ASSESSMENT    1985    Recent Hospitalizations:  No hospitalization(s) within the last year..        Current Medical Providers:  Patient Care Team:  Moe Gold MD as PCP - General  Moe Gold MD as PCP - Family Medicine        Smoking Status:  Social History     Tobacco Use   Smoking Status Former Smoker   • Packs/day: 1.00   • Years: 0.00   • Pack years: 0.00   • Types: Cigars   • Quit date:    • Years since quittin.5   Smokeless Tobacco Never Used   Tobacco Comment    not daily       Alcohol Consumption:  Social History     Substance and Sexual Activity   Alcohol Use Not Currently    Comment: Quit 1 mo. ago       Depression Screen:   PHQ-2/PHQ-9 Depression Screening 2022   Retired PHQ-9 Total Score -   Retired Total Score -   Little Interest or Pleasure in Doing Things 0-->not at all   Feeling Down, Depressed or Hopeless 0-->not at all   PHQ-9: Brief Depression Severity Measure Score 0       Health Habits and Functional and Cognitive Screening:  Functional & Cognitive Status 2022   Do you have difficulty preparing food and eating? No   Do you have difficulty bathing yourself, getting dressed or grooming yourself? No   Do you have difficulty using the toilet? No   Do you have difficulty moving around from place to place? No   Do you have trouble with steps or getting out of a bed or a chair? No   Current Diet Well Balanced Diet   Dental Exam Up to date   Eye Exam Up to date   Exercise (times per week) 3 times per week        Exercise Frequency Comment 2-3   Current Exercises Include (No Data)        Exercise Comment workout at the [facility]   Current Exercise Activities Include -   Do you need help using the phone?  No   Are you deaf or do you have serious difficulty hearing?  Yes   Do you need help with transportation? No   Do you need help shopping? Yes    Do you need help preparing meals?  No   Do you need help with housework?  No   Do you need help with laundry? No   Do you need help taking your medications? No   Do you need help managing money? Yes   Do you ever drive or ride in a car without wearing a seat belt? No   Have you felt unusual stress, anger or loneliness in the last month? Yes   Who do you live with? Alone   If you need help, do you have trouble finding someone available to you? No   Have you been bothered in the last four weeks by sexual problems? No   Do you have difficulty concentrating, remembering or making decisions? No       Fall Risk Screen:  VERONICA Fall Risk Assessment was completed, and patient is at LOW risk for falls.Assessment completed on:7/22/2022    ACE III MINI        Does the patient have evidence of cognitive impairment? No    Aspirin use counseling: Does not need ASA (and currently is not on it)    Recent Lab Results:  CMP:  Lab Results   Component Value Date    BUN 12 07/22/2022    CREATININE 1.47 (H) 07/22/2022    EGFRIFNONA 48 (L) 01/12/2022    BCR 8.2 07/22/2022     07/22/2022    K 4.0 07/22/2022    CO2 26.7 07/22/2022    CALCIUM 9.0 07/22/2022    ALBUMIN 4.60 07/22/2022    BILITOT 0.5 07/22/2022    ALKPHOS 49 07/22/2022    AST 25 07/22/2022    ALT 49 (H) 07/22/2022     HbA1c:  Lab Results   Component Value Date    HGBA1C 6.10 (H) 07/22/2022    HGBA1C 6.24 (H) 01/12/2022     Microalbumin:  Lab Results   Component Value Date    MICROALBUR <1.2 07/12/2021     Lipid Panel  Lab Results   Component Value Date    CHOL 229 (H) 07/22/2022    TRIG 180 (H) 07/22/2022    HDL 41 07/22/2022     (H) 07/22/2022    AST 25 07/22/2022    ALT 49 (H) 07/22/2022       Visual Acuity:  No exam data present    Age-appropriate Screening Schedule:  Refer to the list below for future screening recommendations based on patient's age, sex and/or medical conditions. Orders for these recommended tests are listed in the plan section. The patient  "has been provided with a written plan.    Health Maintenance   Topic Date Due   • TDAP/TD VACCINES (1 - Tdap) Never done   • INFLUENZA VACCINE  10/01/2022   • LIPID PANEL  07/22/2023        Subjective   History of Present Illness    Ever Freeman is a 36 y.o. male who presents for a Subsequent Medicare Annual Wellness examination and for follow-up of prediabetes and hyperlipidemia. He continues to see his psychiatrist for Asperger's, depression, and ADHD.    Insomnia  The patient states that he is feeling better today. He states that he took 3 mg of melatonin, one pill last night and \"slept like a baby.\" The patient reports that he was not trying to take the Vistaril because it gives him weird dreams. He states that in his dream, he had uranium in his room and it exploded and threw his cat to the opposite side of the room. The patient discussed the dream with Dr. Pollock and he recommended medication to assist the patient with sleeping. He refused to want assistance for sleeping in case there was an emergency in the building.      Hypertension  The patient states that he has been checking his blood pressure at home and it has been good.    Weight  He states that he goes to the [facility] twice a week and works out. The patient also is trying to get out to and ambulate when he can. He states that he is trying to get in 60 minutes of exercise a week. The patient reports that his diet is not as bad as it was, but it could improve. He states that he has sensations to certain foods, and will have a feeling of vomiting when tasting them. The patient has reduced his number of sodas he drinks. He states that he had a cholecystectomy. The patient is fasting today. He states that his energy level is good. He states that with the increased heat outside, he does not want to take the risk of exercising outdoors. The patient drank water last night and today. He states that he has Gatorade in his dad's car.     Kidneys  He has " concerns about his kidneys. The patient is feeling the best that he has in a long time.     Medications  The patient notes that he has a refill for his clonazepam, and his aunt will be holding onto these for him. He notes that he does not trust himself with the clonazepam.     Overall health  He denies any problems with breathing, digestion, or bowels. The patient reports that he has regular bowel movements. He denies any urinary issues. He reports that he is able to get on and off of the toilet, and in and out of the shower with no assistance.     Cardiac  The patient reports having tightness in his chest on 07/21/2022, but later on that night it decreased and he was able to relax. He notes coughing and wheezing, with a feeling of vomiting with the chest tightness.       CHRONIC CONDITIONS    The following portions of the patient's history were reviewed and updated as appropriate: allergies, current medications, past family history, past medical history, past social history, past surgical history and problem list.    Outpatient Medications Prior to Visit   Medication Sig Dispense Refill   • busPIRone (BUSPAR) 30 MG tablet Take 45 mg by mouth Every 12 (Twelve) Hours.     • clonazePAM (KlonoPIN) 1 MG tablet Take 0.5 mg by mouth As Needed.     • fluocinonide (LIDEX) 0.05 % external solution Apply 1 application topically to the appropriate area as directed Daily As Needed. scalp     • fluvoxaMINE (LUVOX) 100 MG tablet Take 150 mg by mouth 2 (Two) Times a Day.     • guanFACINE (TENEX) 1 MG tablet Take  by mouth Every 12 (Twelve) Hours.     • hydrOXYzine (ATARAX) 50 MG tablet Take 1 tablet by mouth Daily As Needed.     • melatonin 5 MG tablet tablet Take 10 mg by mouth At Night As Needed.     • omeprazole (priLOSEC) 20 MG capsule TAKE ONE CAPSULE BY MOUTH DAILY 30 capsule 1   • ziprasidone (GEODON) 80 MG capsule Take 80 mg by mouth 2 (Two) Times a Day With Meals.       No facility-administered medications prior to visit.        Patient Active Problem List   Diagnosis   • Anxiety state   • Obsessive compulsive disorder   • Annual physical exam   • Moderate episode of recurrent major depressive disorder (HCC)   • Chronic kidney disease, stage 2 (mild)   • Hyperlipidemia   • Allergic rhinitis   • Asperger's disorder   • Prediabetes   • Attention deficit disorder of childhood with hyperactivity       Advance Care Planning:  ACP discussion was held with the patient during this visit. Patient has an advance directive in EMR which is still valid.     Identification of Risk Factors:  Risk factors include: Advance Directive Discussion.    Review of Systems   Constitutional: Negative for chills, fatigue and fever.   HENT: Negative for congestion, ear pain and sinus pressure.    Respiratory: Negative for cough, chest tightness, shortness of breath and wheezing.    Cardiovascular: Negative for chest pain and palpitations.   Gastrointestinal: Negative for abdominal pain, blood in stool and constipation.   Skin: Negative for color change.   Allergic/Immunologic: Negative for environmental allergies.   Neurological: Negative for dizziness, speech difficulty and headaches.   Psychiatric/Behavioral: Negative for confusion. The patient is not nervous/anxious.        Compared to one year ago, the patient feels his physical health is the same.  Compared to one year ago, the patient feels his mental health is the same.    Objective     Physical Exam  Vitals and nursing note reviewed.   Constitutional:       Appearance: He is well-developed.   HENT:      Head: Normocephalic and atraumatic.      Right Ear: External ear normal.      Left Ear: External ear normal.      Nose: Nose normal.      Mouth/Throat:      Pharynx: No oropharyngeal exudate.   Eyes:      Conjunctiva/sclera: Conjunctivae normal.      Pupils: Pupils are equal, round, and reactive to light.   Neck:      Thyroid: No thyromegaly.      Vascular: No JVD.   Cardiovascular:      Rate and Rhythm:  "Normal rate and regular rhythm.      Heart sounds: Normal heart sounds. No murmur heard.    No friction rub. No gallop.   Pulmonary:      Effort: Pulmonary effort is normal. No respiratory distress.      Breath sounds: Normal breath sounds. No wheezing or rales.   Chest:      Chest wall: No tenderness.   Abdominal:      General: Bowel sounds are normal. There is no distension.      Palpations: Abdomen is soft. There is no mass.      Tenderness: There is no abdominal tenderness. There is no guarding or rebound.      Hernia: No hernia is present.   Musculoskeletal:         General: No tenderness. Normal range of motion.      Cervical back: Normal range of motion and neck supple.   Lymphadenopathy:      Cervical: No cervical adenopathy.   Skin:     General: Skin is warm and dry.      Findings: No erythema or rash.   Neurological:      Mental Status: He is alert and oriented to person, place, and time.      Cranial Nerves: No cranial nerve deficit.      Sensory: No sensory deficit.      Motor: No abnormal muscle tone.      Coordination: Coordination normal.      Deep Tendon Reflexes: Reflexes normal.   Psychiatric:         Behavior: Behavior normal.         Thought Content: Thought content normal.         Judgment: Judgment normal.          Procedures     Vitals:    07/22/22 1020 07/22/22 1032   BP: (!) 132/102 107/60   BP Location: Left arm    Patient Position: Sitting    Cuff Size: Adult    Pulse: 72    Temp: 98.2 °F (36.8 °C)    Weight: 70.4 kg (155 lb 3.2 oz)    Height: 162.6 cm (64\")    PainSc: 0-No pain        BMI is >= 25 and <30. (Overweight) The following options were offered after discussion;: exercise counseling/recommendations and nutrition counseling/recommendations      Assessment & Plan      1. Prevention  - Overall, he is doing a reasonably good job of taking care of himself. He tries to walk most days and follows a healthy diet. He is fully vaccinated against COVID-19.    2. Prediabetes  - A1c is " pending. The treatment remains healthy diet and avoidance of weight gain.    3. Hyperlipidemia  - Lipid panel is pending. Treatment remains diet and exercise.    4. Elevated blood pressure  - Blood pressures have been consistently normal at home. When he is under stress, it goes up. He will continue to monitor that. He will have lab work today. He has had evidence of renal insufficiency previously, but I think he has been dehydrated. He will make sure he is well hydrated today.    Follow up in 6 months.      Problem List Items Addressed This Visit        Cardiac and Vasculature    Hyperlipidemia    Relevant Orders    Comprehensive Metabolic Panel (Completed)    Lipid Panel (Completed)    Apolipoprotein B (Completed)       Endocrine and Metabolic    Prediabetes    Relevant Orders    Hemoglobin A1c (Completed)       Health Encounters    Annual physical exam - Primary       Mental Health    Moderate episode of recurrent major depressive disorder (HCC)    Relevant Medications    ziprasidone (GEODON) 80 MG capsule    busPIRone (BUSPAR) 30 MG tablet    fluvoxaMINE (LUVOX) 100 MG tablet    hydrOXYzine (ATARAX) 50 MG tablet    Attention deficit disorder of childhood with hyperactivity    Relevant Medications    ziprasidone (GEODON) 80 MG capsule    busPIRone (BUSPAR) 30 MG tablet    fluvoxaMINE (LUVOX) 100 MG tablet    hydrOXYzine (ATARAX) 50 MG tablet       Neuro    Asperger's disorder    Relevant Medications    ziprasidone (GEODON) 80 MG capsule    busPIRone (BUSPAR) 30 MG tablet    fluvoxaMINE (LUVOX) 100 MG tablet    hydrOXYzine (ATARAX) 50 MG tablet      Other Visit Diagnoses     Primary hypertension        Relevant Orders    CBC & Differential (Completed)        Patient Self-Management and Personalized Health Advice  The patient has been provided with information about: diet, exercise and weight management and preventive services including:   · Annual Wellness Visit (AWV).    Outpatient Encounter Medications as of  7/22/2022   Medication Sig Dispense Refill   • busPIRone (BUSPAR) 30 MG tablet Take 45 mg by mouth Every 12 (Twelve) Hours.     • clonazePAM (KlonoPIN) 1 MG tablet Take 0.5 mg by mouth As Needed.     • fluocinonide (LIDEX) 0.05 % external solution Apply 1 application topically to the appropriate area as directed Daily As Needed. scalp     • fluvoxaMINE (LUVOX) 100 MG tablet Take 150 mg by mouth 2 (Two) Times a Day.     • guanFACINE (TENEX) 1 MG tablet Take  by mouth Every 12 (Twelve) Hours.     • hydrOXYzine (ATARAX) 50 MG tablet Take 1 tablet by mouth Daily As Needed.     • melatonin 5 MG tablet tablet Take 10 mg by mouth At Night As Needed.     • omeprazole (priLOSEC) 20 MG capsule TAKE ONE CAPSULE BY MOUTH DAILY 30 capsule 1   • ziprasidone (GEODON) 80 MG capsule Take 80 mg by mouth 2 (Two) Times a Day With Meals.       No facility-administered encounter medications on file as of 7/22/2022.       Reviewed use of high risk medication in the elderly: yes  Reviewed for potential of harmful drug interactions in the elderly: yes    Follow Up:  Return in about 6 months (around 1/22/2023) for follow up.     There are no Patient Instructions on file for this visit.    An After Visit Summary and PPPS with all of these plans were given to the patient.          Transcribed from ambient dictation for Moe Gold MD by Moraima Caldwell.  07/22/22   11:45 EDT    Patient verbalized consent to the visit recording.  I have personally performed the services described in this document as transcribed by the above individual, and it is both accurate and complete.  Moraima Caldwell  7/24/2022  11:45 EDT

## 2022-07-24 LAB — APO B SERPL-MCNC: 146 MG/DL

## 2022-07-24 RX ORDER — ROSUVASTATIN CALCIUM 5 MG/1
5 TABLET, COATED ORAL DAILY
Qty: 90 TABLET | Refills: 1 | Status: SHIPPED | OUTPATIENT
Start: 2022-07-24 | End: 2022-10-04 | Stop reason: SDUPTHER

## 2022-09-19 RX ORDER — OMEPRAZOLE 20 MG/1
CAPSULE, DELAYED RELEASE ORAL
Qty: 30 CAPSULE | Refills: 1 | Status: SHIPPED | OUTPATIENT
Start: 2022-09-19 | End: 2022-11-21

## 2022-10-04 RX ORDER — ROSUVASTATIN CALCIUM 5 MG/1
5 TABLET, COATED ORAL DAILY
Qty: 90 TABLET | Refills: 1 | Status: SHIPPED | OUTPATIENT
Start: 2022-10-04 | End: 2022-10-23 | Stop reason: SDUPTHER

## 2022-10-15 ENCOUNTER — PATIENT MESSAGE (OUTPATIENT)
Dept: INTERNAL MEDICINE | Facility: CLINIC | Age: 37
End: 2022-10-15

## 2022-10-17 NOTE — TELEPHONE ENCOUNTER
From: Ever Freeman  To: Moe Gold MD  Sent: 10/15/2022 4:36 PM EDT  Subject: Covid booster    Hey Dr Gold wanted to let you know I'm going to get my updated covid booster on Tuesday the 18th at [facility] at 2pm.should I be worried about side effects? I thought the last covid booster I got was very strong.

## 2022-10-24 RX ORDER — ROSUVASTATIN CALCIUM 5 MG/1
5 TABLET, COATED ORAL DAILY
Qty: 90 TABLET | Refills: 1 | Status: SHIPPED | OUTPATIENT
Start: 2022-10-24 | End: 2022-12-12 | Stop reason: SDUPTHER

## 2022-11-21 RX ORDER — OMEPRAZOLE 20 MG/1
CAPSULE, DELAYED RELEASE ORAL
Qty: 30 CAPSULE | Refills: 1 | Status: SHIPPED | OUTPATIENT
Start: 2022-11-21 | End: 2023-02-03

## 2022-12-12 RX ORDER — ROSUVASTATIN CALCIUM 5 MG/1
5 TABLET, COATED ORAL DAILY
Qty: 90 TABLET | Refills: 1 | Status: SHIPPED | OUTPATIENT
Start: 2022-12-12 | End: 2023-01-31 | Stop reason: SDUPTHER

## 2023-01-27 ENCOUNTER — LAB (OUTPATIENT)
Dept: LAB | Facility: HOSPITAL | Age: 38
End: 2023-01-27
Payer: MEDICARE

## 2023-01-27 ENCOUNTER — OFFICE VISIT (OUTPATIENT)
Dept: INTERNAL MEDICINE | Facility: CLINIC | Age: 38
End: 2023-01-27
Payer: MEDICARE

## 2023-01-27 VITALS
SYSTOLIC BLOOD PRESSURE: 104 MMHG | WEIGHT: 155.4 LBS | HEART RATE: 76 BPM | TEMPERATURE: 98.2 F | BODY MASS INDEX: 26.53 KG/M2 | HEIGHT: 64 IN | DIASTOLIC BLOOD PRESSURE: 80 MMHG

## 2023-01-27 DIAGNOSIS — E78.2 MIXED HYPERLIPIDEMIA: Primary | ICD-10-CM

## 2023-01-27 DIAGNOSIS — Z13.0 SCREENING FOR DEFICIENCY ANEMIA: ICD-10-CM

## 2023-01-27 DIAGNOSIS — R73.03 PREDIABETES: ICD-10-CM

## 2023-01-27 DIAGNOSIS — F41.1 ANXIETY STATE: ICD-10-CM

## 2023-01-27 DIAGNOSIS — E78.2 MIXED HYPERLIPIDEMIA: ICD-10-CM

## 2023-01-27 DIAGNOSIS — F33.1 MODERATE EPISODE OF RECURRENT MAJOR DEPRESSIVE DISORDER: ICD-10-CM

## 2023-01-27 PROCEDURE — 99214 OFFICE O/P EST MOD 30 MIN: CPT | Performed by: INTERNAL MEDICINE

## 2023-01-27 NOTE — PROGRESS NOTES
[facility]     [facility]  1985   [id number]      Patient Care Team:  Moe Gold MD as PCP - General  Moe Gold MD as PCP - Family Medicine    Chief Complaint::   Chief Complaint   Patient presents with   • Hyperlipidemia   • ADHD        HPI    The patient presents today for follow-up of hyperlipidemia, prediabetes, anxiety and depression. He continues to see his psychiatrist for follow-up of attention deficit hyperactivity disorder, depression, and Asperger syndrome.    Right hand numbness  The patient reports that he feels well physically overall. He explains that last night when he was laying down and holding his phone, his right hand began to go numb. As soon as he rolled his hand back the other direction, the numbness resolved and he feels that he may have been cutting off his circulation.     Hyperlipidemia  He states he has been taking rosuvastatin at night.    Gastroesophageal reflux disease  The patient reports that he is still taking his omeprazole and is not experiencing symptoms of heartburn at this time.     Anxiety and depression  The patient reports that he has been sleeping a lot better recently and has learned that he tries stress reduction before laying down to sleep at night and that seems to have helped his symptoms. He quit smoking over 1 year ago. He thinks his appetite is conditional and he has messaged me about this in the past. He spoke to his psychologist about this issue with decreased appetite at work and was recommended to try eating a protein bar during those times and when he tried this 1 week ago, he confirmed that it worked well for him. He has been walking more at work recently and he attends the [employer] 1 to 2 times per week. He would like to increase his walking, but the weather has prevented that lately.       Chronic Conditions: Hyperlipidemia, prediabetes, anxiety and depression. He continues to see his psychiatrist for  follow-up of attention deficit hyperactivity disorder, depression, and Asperger syndrome.    Patient Active Problem List   Diagnosis   • Anxiety state   • Obsessive compulsive disorder   • Annual physical exam   • Moderate episode of recurrent major depressive disorder (HCC)   • Chronic kidney disease, stage 2 (mild)   • Hyperlipidemia   • Allergic rhinitis   • Asperger's disorder   • Prediabetes   • Attention deficit disorder of childhood with hyperactivity        Past Medical History:   Diagnosis Date   • Alcohol abuse    • Anxiety    • Asperger's syndrome    • Neurocardiogenic syncope    • OCD (obsessive compulsive disorder)    • Severe hearing loss     Hearing loss, severe right sensorineural       Past Surgical History:   Procedure Laterality Date   • CHOLECYSTECTOMY  09/28/2015   • TYMPANOSTOMY  1986       No family history on file.    Social History     Socioeconomic History   • Marital status: Single   Tobacco Use   • Smoking status: Former     Packs/day: 1.00     Years: 0.00     Pack years: 0.00     Types: Cigars, Cigarettes     Quit date: 2020     Years since quitting: 3.0   • Smokeless tobacco: Never   • Tobacco comments:     not daily   Vaping Use   • Vaping Use: Never used   Substance and Sexual Activity   • Alcohol use: Not Currently     Comment: Quit 1 mo. ago   • Drug use: No   • Sexual activity: Defer       Allergies   Allergen Reactions   • Demerol [Meperidine] Hives         Current Outpatient Medications:   •  busPIRone (BUSPAR) 30 MG tablet, Take 45 mg by mouth Every 12 (Twelve) Hours., Disp: , Rfl:   •  clonazePAM (KlonoPIN) 1 MG tablet, Take 0.5 mg by mouth As Needed., Disp: , Rfl:   •  fluocinonide (LIDEX) 0.05 % external solution, Apply 1 application topically to the appropriate area as directed Daily As Needed. scalp, Disp: , Rfl:   •  fluvoxaMINE (LUVOX) 100 MG tablet, Take 150 mg by mouth 2 (Two) Times a Day., Disp: , Rfl:   •  guanFACINE (TENEX) 1 MG tablet, Take  by mouth Every 12  "(Twelve) Hours., Disp: , Rfl:   •  hydrOXYzine (ATARAX) 50 MG tablet, Take 1 tablet by mouth Daily As Needed., Disp: , Rfl:   •  melatonin 5 MG tablet tablet, Take 10 mg by mouth At Night As Needed., Disp: , Rfl:   •  omeprazole (priLOSEC) 20 MG capsule, TAKE ONE CAPSULE BY MOUTH DAILY, Disp: 30 capsule, Rfl: 1  •  rosuvastatin (Crestor) 5 MG tablet, Take 1 tablet by mouth Daily., Disp: 90 tablet, Rfl: 1  •  ziprasidone (GEODON) 80 MG capsule, Take 80 mg by mouth 2 (Two) Times a Day With Meals., Disp: , Rfl:     Review of Systems   Constitutional: Negative for chills, fatigue and fever.   HENT: Negative for congestion, ear pain and sinus pressure.    Respiratory: Negative for cough, chest tightness, shortness of breath and wheezing.    Cardiovascular: Negative for chest pain and palpitations.   Gastrointestinal: Negative for abdominal pain, blood in stool and constipation.   Skin: Negative for color change.   Allergic/Immunologic: Negative for environmental allergies.   Neurological: Negative for dizziness, speech difficulty and headache.   Psychiatric/Behavioral: Negative for decreased concentration. The patient is not nervous/anxious.         Vital Signs  Vitals:    01/27/23 0822   BP: 104/80   BP Location: Left arm   Patient Position: Sitting   Cuff Size: Adult   Pulse: 76   Temp: 98.2 °F (36.8 °C)   Weight: 70.5 kg (155 lb 6.4 oz)   Height: 162.6 cm (64.02\")   PainSc: 0-No pain       Physical Exam  Vitals and nursing note reviewed.   Constitutional:       General: He is not in acute distress.     Appearance: He is well-developed.      Comments: Patient is overweight.   HENT:      Head: Normocephalic and atraumatic.      Right Ear: External ear normal.      Left Ear: External ear normal.   Eyes:      Conjunctiva/sclera: Conjunctivae normal.      Pupils: Pupils are equal, round, and reactive to light.   Neck:      Thyroid: No thyromegaly.      Vascular: No JVD.      Trachea: No tracheal deviation. "   Cardiovascular:      Rate and Rhythm: Normal rate and regular rhythm.      Heart sounds: Normal heart sounds. No murmur heard.  Pulmonary:      Effort: Pulmonary effort is normal.      Breath sounds: Normal breath sounds.   Abdominal:      General: Bowel sounds are normal. There is no distension.      Palpations: Abdomen is soft. There is no mass.      Tenderness: There is no abdominal tenderness. There is no guarding or rebound.   Musculoskeletal:      Cervical back: Normal range of motion and neck supple.   Lymphadenopathy:      Cervical: No cervical adenopathy.   Skin:     General: Skin is warm and dry.      Capillary Refill: Capillary refill takes less than 2 seconds.   Neurological:      Mental Status: He is alert and oriented to person, place, and time.      Cranial Nerves: No cranial nerve deficit.   Psychiatric:         Behavior: Behavior normal.          Procedures    ACE III MINI             Assessment/Plan:    Diagnoses and all orders for this visit:    1. Mixed hyperlipidemia (Primary)  -     Apolipoprotein B; Future  -     Comprehensive Metabolic Panel; Future  -     Lipid Panel; Future    2. Prediabetes  -     Hemoglobin A1c; Future    3. Anxiety state    4. Moderate episode of recurrent major depressive disorder (HCC)    5. Screening for deficiency anemia  -     CBC & Differential; Future        Plan:    1. Hyperlipidemia  - Lipid panel is pending.  - Continue rosuvastatin and attempts at healthy diet.    2. Prediabetes  - Hemoglobin A1c is pending.  - The treatment is healthy diet and avoidance of weight gain.    3. Anxiety and depression  - He will continue to follow up with his psychiatrist.    4. Gastroesophageal reflux disease  - Symptoms are now completely controlled.  - He may begin to wean omeprazole every other day for 2 weeks.  - If he has no recurrence of symptoms at that point, he can go to every third day and then discontinue.    Follow up in 6 months.    Plan of care reviewed with  patient at the conclusion of today's visit. Education was provided regarding diagnosis, management, and any prescribed or recommended OTC medications.Patient verbalizes understanding of and agreement with management plan.         Moe Gold MD           Answers for HPI/ROS submitted by the patient on 1/20/2023  What is the primary reason for your visit?: Physical    Transcribed from ambient dictation for Moe Gold MD by Rama Glez.  01/27/23   09:41 EST    Patient or patient representative verbalized consent to the visit recording.  I have personally performed the services described in this document as transcribed by the above individual, and it is both accurate and complete.

## 2023-01-29 LAB — APO B SERPL-MCNC: 102 MG/DL

## 2023-01-30 LAB
ALBUMIN SERPL-MCNC: 5.1 G/DL (ref 3.5–5.2)
ALBUMIN/GLOB SERPL: 1.6 G/DL
ALP SERPL-CCNC: 62 U/L (ref 39–117)
ALT SERPL-CCNC: 47 U/L (ref 1–41)
AST SERPL-CCNC: 27 U/L (ref 1–40)
BILIRUB SERPL-MCNC: 0.3 MG/DL (ref 0–1.2)
BUN SERPL-MCNC: 12 MG/DL (ref 6–20)
BUN/CREAT SERPL: 8 (ref 7–25)
CALCIUM SERPL-MCNC: 10.3 MG/DL (ref 8.6–10.5)
CHLORIDE SERPL-SCNC: 99 MMOL/L (ref 98–107)
CHOLEST SERPL-MCNC: 202 MG/DL (ref 0–200)
CO2 SERPL-SCNC: 30.9 MMOL/L (ref 22–29)
CREAT SERPL-MCNC: 1.5 MG/DL (ref 0.76–1.27)
DIFFERENTIAL COMMENT: ABNORMAL
EGFRCR SERPLBLD CKD-EPI 2021: 61.1 ML/MIN/1.73
EOSINOPHIL # BLD MANUAL: 0.24 10*3/MM3 (ref 0–0.4)
EOSINOPHIL NFR BLD MANUAL: 2 % (ref 0.3–6.2)
ERYTHROCYTE [DISTWIDTH] IN BLOOD BY AUTOMATED COUNT: 12.8 % (ref 12.3–15.4)
GLOBULIN SER CALC-MCNC: 3.1 GM/DL
GLUCOSE SERPL-MCNC: 98 MG/DL (ref 65–99)
HBA1C MFR BLD: 5.8 % (ref 4.8–5.6)
HCT VFR BLD AUTO: 49.7 % (ref 37.5–51)
HDLC SERPL-MCNC: 41 MG/DL (ref 40–60)
HGB BLD-MCNC: 16.1 G/DL (ref 13–17.7)
LDLC SERPL CALC-MCNC: 95 MG/DL (ref 0–100)
LYMPHOCYTES # BLD MANUAL: 6.11 10*3/MM3 (ref 0.7–3.1)
LYMPHOCYTES NFR BLD MANUAL: 50 % (ref 19.6–45.3)
MCH RBC QN AUTO: 25.9 PG (ref 26.6–33)
MCHC RBC AUTO-ENTMCNC: 32.4 G/DL (ref 31.5–35.7)
MCV RBC AUTO: 79.9 FL (ref 79–97)
MONOCYTES # BLD MANUAL: 0.73 10*3/MM3 (ref 0.1–0.9)
MONOCYTES NFR BLD MANUAL: 6 % (ref 5–12)
NEUTROPHILS # BLD MANUAL: 5.13 10*3/MM3 (ref 1.7–7)
NEUTROPHILS NFR BLD MANUAL: 42 % (ref 42.7–76)
NRBC BLD AUTO-RTO: 0 /100 WBC (ref 0–0.2)
PLATELET # BLD AUTO: 234 10*3/MM3 (ref 140–450)
PLATELET BLD QL SMEAR: ABNORMAL
POTASSIUM SERPL-SCNC: 4.2 MMOL/L (ref 3.5–5.2)
PROT SERPL-MCNC: 8.2 G/DL (ref 6–8.5)
RBC # BLD AUTO: 6.22 10*6/MM3 (ref 4.14–5.8)
RBC MORPH BLD: ABNORMAL
SODIUM SERPL-SCNC: 139 MMOL/L (ref 136–145)
TRIGL SERPL-MCNC: 394 MG/DL (ref 0–150)
VLDLC SERPL CALC-MCNC: 66 MG/DL (ref 5–40)
WBC # BLD AUTO: 12.22 10*3/MM3 (ref 3.4–10.8)

## 2023-01-31 ENCOUNTER — TELEPHONE (OUTPATIENT)
Dept: INTERNAL MEDICINE | Facility: CLINIC | Age: 38
End: 2023-01-31

## 2023-01-31 RX ORDER — ROSUVASTATIN CALCIUM 10 MG/1
10 TABLET, COATED ORAL DAILY
Qty: 90 TABLET | Refills: 3 | Status: SHIPPED | OUTPATIENT
Start: 2023-01-31 | End: 2023-03-03 | Stop reason: SDUPTHER

## 2023-01-31 NOTE — TELEPHONE ENCOUNTER
PATIENT HAS CALLED TO LET PCP KNOW THAT HE WAS PLEASED WITH DR. IRENE BEING PLEASED WITH HIS PROGRESS. PATIENT STATES HE STARTED TODAY TAKING  rosuvastatin (Crestor) 10 MG tablet. PATIENT STATES HE HAS BEEN SLEEPING BETTER AND FEELS GOOD ABOUT HIS OVERALL HEALTH.  PATIENT WANTS TO THANK DR. IRENE FOR HIS TIME.

## 2023-02-03 RX ORDER — OMEPRAZOLE 20 MG/1
CAPSULE, DELAYED RELEASE ORAL
Qty: 30 CAPSULE | Refills: 1 | Status: SHIPPED | OUTPATIENT
Start: 2023-02-03 | End: 2023-03-10

## 2023-03-06 RX ORDER — ROSUVASTATIN CALCIUM 10 MG/1
10 TABLET, COATED ORAL DAILY
Qty: 90 TABLET | Refills: 3 | Status: SHIPPED | OUTPATIENT
Start: 2023-03-06

## 2023-04-06 RX ORDER — OMEPRAZOLE 20 MG/1
CAPSULE, DELAYED RELEASE ORAL
Qty: 30 CAPSULE | Refills: 5 | Status: SHIPPED | OUTPATIENT
Start: 2023-04-06

## 2023-04-06 NOTE — TELEPHONE ENCOUNTER
Rx Refill Note  Requested Prescriptions     Pending Prescriptions Disp Refills   • omeprazole (priLOSEC) 20 MG capsule [Pharmacy Med Name: OMEPRAZOLE DR 20 MG CAPSULE] 30 capsule      Sig: TAKE ONE CAPSULE BY MOUTH DAILY      Last office visit with prescribing clinician: 1/27/2023   Last telemedicine visit with prescribing clinician: 8/3/2023   Next office visit with prescribing clinician: 8/3/2023                         Would you like a call back once the refill request has been completed: [] Yes [] No    If the office needs to give you a call back, can they leave a voicemail: [] Yes [] No    Hailee Ball LPN  04/06/23, 08:14 EDT

## 2023-08-01 DIAGNOSIS — Z13.0 SCREENING FOR DEFICIENCY ANEMIA: ICD-10-CM

## 2023-08-01 DIAGNOSIS — E78.2 MIXED HYPERLIPIDEMIA: Primary | ICD-10-CM

## 2023-08-01 DIAGNOSIS — Z13.29 SCREENING FOR THYROID DISORDER: ICD-10-CM

## 2023-08-01 DIAGNOSIS — R73.03 PREDIABETES: ICD-10-CM

## 2023-08-02 ENCOUNTER — LAB (OUTPATIENT)
Dept: LAB | Facility: HOSPITAL | Age: 38
End: 2023-08-02
Payer: MEDICARE

## 2023-08-02 DIAGNOSIS — Z13.29 SCREENING FOR THYROID DISORDER: ICD-10-CM

## 2023-08-02 DIAGNOSIS — Z13.0 SCREENING FOR DEFICIENCY ANEMIA: ICD-10-CM

## 2023-08-02 DIAGNOSIS — R73.03 PREDIABETES: ICD-10-CM

## 2023-08-02 DIAGNOSIS — E78.2 MIXED HYPERLIPIDEMIA: ICD-10-CM

## 2023-08-03 ENCOUNTER — OFFICE VISIT (OUTPATIENT)
Dept: INTERNAL MEDICINE | Facility: CLINIC | Age: 38
End: 2023-08-03
Payer: MEDICARE

## 2023-08-03 VITALS
BODY MASS INDEX: 27.31 KG/M2 | HEIGHT: 64 IN | HEART RATE: 84 BPM | TEMPERATURE: 97.8 F | WEIGHT: 160 LBS | DIASTOLIC BLOOD PRESSURE: 76 MMHG | SYSTOLIC BLOOD PRESSURE: 108 MMHG

## 2023-08-03 DIAGNOSIS — F33.1 MODERATE EPISODE OF RECURRENT MAJOR DEPRESSIVE DISORDER: ICD-10-CM

## 2023-08-03 DIAGNOSIS — Z00.00 ANNUAL PHYSICAL EXAM: Primary | ICD-10-CM

## 2023-08-03 DIAGNOSIS — F90.9 ATTENTION DEFICIT DISORDER OF CHILDHOOD WITH HYPERACTIVITY: ICD-10-CM

## 2023-08-03 DIAGNOSIS — R73.03 PREDIABETES: ICD-10-CM

## 2023-08-03 DIAGNOSIS — E78.2 MIXED HYPERLIPIDEMIA: ICD-10-CM

## 2023-08-03 LAB
ALBUMIN SERPL-MCNC: 4.7 G/DL (ref 3.5–5.2)
ALBUMIN/GLOB SERPL: 1.5 G/DL
ALP SERPL-CCNC: 65 U/L (ref 39–117)
ALT SERPL-CCNC: 39 U/L (ref 1–41)
AST SERPL-CCNC: 28 U/L (ref 1–40)
BASOPHILS # BLD AUTO: 0.08 10*3/MM3 (ref 0–0.2)
BASOPHILS NFR BLD AUTO: 0.9 % (ref 0–1.5)
BILIRUB SERPL-MCNC: 0.5 MG/DL (ref 0–1.2)
BUN SERPL-MCNC: 6 MG/DL (ref 6–20)
BUN/CREAT SERPL: 3.8 (ref 7–25)
CALCIUM SERPL-MCNC: 10.1 MG/DL (ref 8.6–10.5)
CHLORIDE SERPL-SCNC: 104 MMOL/L (ref 98–107)
CHOLEST SERPL-MCNC: 123 MG/DL (ref 0–200)
CO2 SERPL-SCNC: 27.1 MMOL/L (ref 22–29)
CREAT SERPL-MCNC: 1.6 MG/DL (ref 0.76–1.27)
EGFRCR SERPLBLD CKD-EPI 2021: 56.6 ML/MIN/1.73
EOSINOPHIL # BLD AUTO: 0.12 10*3/MM3 (ref 0–0.4)
EOSINOPHIL NFR BLD AUTO: 1.4 % (ref 0.3–6.2)
ERYTHROCYTE [DISTWIDTH] IN BLOOD BY AUTOMATED COUNT: 15.2 % (ref 12.3–15.4)
GLOBULIN SER CALC-MCNC: 3.1 GM/DL
GLUCOSE SERPL-MCNC: 106 MG/DL (ref 65–99)
HBA1C MFR BLD: 6.2 % (ref 4.8–5.6)
HCT VFR BLD AUTO: 43.6 % (ref 37.5–51)
HDLC SERPL-MCNC: 34 MG/DL (ref 40–60)
HGB BLD-MCNC: 14.5 G/DL (ref 13–17.7)
IMM GRANULOCYTES # BLD AUTO: 0.01 10*3/MM3 (ref 0–0.05)
IMM GRANULOCYTES NFR BLD AUTO: 0.1 % (ref 0–0.5)
LDLC SERPL CALC-MCNC: 66 MG/DL (ref 0–100)
LYMPHOCYTES # BLD AUTO: 3.81 10*3/MM3 (ref 0.7–3.1)
LYMPHOCYTES NFR BLD AUTO: 45.1 % (ref 19.6–45.3)
MCH RBC QN AUTO: 27.7 PG (ref 26.6–33)
MCHC RBC AUTO-ENTMCNC: 33.3 G/DL (ref 31.5–35.7)
MCV RBC AUTO: 83.4 FL (ref 79–97)
MONOCYTES # BLD AUTO: 0.53 10*3/MM3 (ref 0.1–0.9)
MONOCYTES NFR BLD AUTO: 6.3 % (ref 5–12)
NEUTROPHILS # BLD AUTO: 3.9 10*3/MM3 (ref 1.7–7)
NEUTROPHILS NFR BLD AUTO: 46.2 % (ref 42.7–76)
NRBC BLD AUTO-RTO: 0 /100 WBC (ref 0–0.2)
PLATELET # BLD AUTO: 221 10*3/MM3 (ref 140–450)
POTASSIUM SERPL-SCNC: 4.4 MMOL/L (ref 3.5–5.2)
PROT SERPL-MCNC: 7.8 G/DL (ref 6–8.5)
RBC # BLD AUTO: 5.23 10*6/MM3 (ref 4.14–5.8)
SODIUM SERPL-SCNC: 143 MMOL/L (ref 136–145)
TRIGL SERPL-MCNC: 126 MG/DL (ref 0–150)
TSH SERPL DL<=0.005 MIU/L-ACNC: 2.68 UIU/ML (ref 0.27–4.2)
VLDLC SERPL CALC-MCNC: 23 MG/DL (ref 5–40)
WBC # BLD AUTO: 8.45 10*3/MM3 (ref 3.4–10.8)

## 2023-08-03 NOTE — PROGRESS NOTES
QUICK REFERENCE INFORMATION:  The ABCs of the Annual Wellness Visit    Subsequent Medicare Wellness Visit    HEALTH RISK ASSESSMENT    1985    Recent Hospitalizations:  No hospitalization(s) within the last year..        Current Medical Providers:  Patient Care Team:  Moe Gold MD as PCP - General  Moe Gold MD as PCP - Family Medicine        Smoking Status:  Social History     Tobacco Use   Smoking Status Former    Packs/day: 1.00    Years: 0.00    Pack years: 0.00    Types: Cigars, Cigarettes    Quit date:     Years since quitting: 3.5   Smokeless Tobacco Never   Tobacco Comments    not daily       Alcohol Consumption:  Social History     Substance and Sexual Activity   Alcohol Use Not Currently    Alcohol/week: 3.0 standard drinks    Types: 2 Shots of liquor, 1 Drinks containing 0.5 oz of alcohol per week    Comment: Most intermittently i would say quit hard liquor last year,       Depression Screen:       8/3/2023     8:43 AM   PHQ-2/PHQ-9 Depression Screening   Little Interest or Pleasure in Doing Things 0-->not at all   Feeling Down, Depressed or Hopeless 0-->not at all   PHQ-9: Brief Depression Severity Measure Score 0       Health Habits and Functional and Cognitive Screenin/3/2023     8:40 AM   Functional & Cognitive Status   Do you have difficulty preparing food and eating? No   Do you have difficulty bathing yourself, getting dressed or grooming yourself? No   Do you have difficulty using the toilet? No   Do you have difficulty moving around from place to place? No   Do you have trouble with steps or getting out of a bed or a chair? No   Current Diet Unhealthy Diet   Dental Exam Up to date   Eye Exam Up to date   Exercise (times per week) 2 times per week   Current Exercises Include Cardiovascular Workout   Do you need help using the phone?  No   Are you deaf or do you have serious difficulty hearing?  Yes   Do you need help to go to places out of walking  distance? Yes   Do you need help shopping? No   Do you need help preparing meals?  No   Do you need help with housework?  No   Do you need help with laundry? No   Do you need help taking your medications? No   Do you need help managing money? No   Do you ever drive or ride in a car without wearing a seat belt? No   Have you felt unusual stress, anger or loneliness in the last month? No   Who do you live with? Alone   If you need help, do you have trouble finding someone available to you? No   Have you been bothered in the last four weeks by sexual problems? No   Do you have difficulty concentrating, remembering or making decisions? Yes       Fall Risk Screen:  [facility] Fall Risk Assessment was completed, and patient is at LOW risk for falls.Assessment completed on:8/3/2023    ACE III MINI        Does the patient have evidence of cognitive impairment? No    Aspirin use counseling: Does not need ASA (and currently is not on it)    Recent Lab Results:  CMP:  Lab Results   Component Value Date    BUN 6 08/02/2023    CREATININE 1.60 (H) 08/02/2023    EGFRIFNONA 48 (L) 01/12/2022    BCR 3.8 (L) 08/02/2023     08/02/2023    K 4.4 08/02/2023    CO2 27.1 08/02/2023    CALCIUM 10.1 08/02/2023    PROTENTOTREF 7.8 08/02/2023    ALBUMIN 4.7 08/02/2023    LABGLOBREF 3.1 08/02/2023    LABIL2 1.5 08/02/2023    BILITOT 0.5 08/02/2023    ALKPHOS 65 08/02/2023    AST 28 08/02/2023    ALT 39 08/02/2023     HbA1c:  Lab Results   Component Value Date    HGBA1C 6.20 (H) 08/02/2023    HGBA1C 5.80 (H) 01/27/2023     Microalbumin:  Lab Results   Component Value Date    MICROALBUR <1.2 07/12/2021     Lipid Panel  Lab Results   Component Value Date    CHOL 229 (H) 07/22/2022    TRIG 126 08/02/2023    HDL 34 (L) 08/02/2023    LDL 66 08/02/2023    AST 28 08/02/2023    ALT 39 08/02/2023       Visual Acuity:  No results found.    Age-appropriate Screening Schedule:  Refer to the list below for future screening recommendations based on  patient's age, sex and/or medical conditions. Orders for these recommended tests are listed in the plan section. The patient has been provided with a written plan.    Health Maintenance   Topic Date Due    Hepatitis B (1 of 3 - 3-dose series) Never done    ANNUAL WELLNESS VISIT  07/22/2023    INFLUENZA VACCINE  10/01/2023    LIPID PANEL  08/02/2024    TDAP/TD VACCINES (2 - Td or Tdap) 10/16/2032    HEPATITIS C SCREENING  Completed    COVID-19 Vaccine  Completed    Pneumococcal Vaccine 0-64  Aged Out        Subjective   History of Present Illness    Ever Freeman is a 37 y.o. male who presents for a Subsequent Wellness Visit and for follow-up on hyperlipidemia and prediabetes. He continues to see his mental health professional for depression and attention deficit disorder.    Depression and attention deficit disorder  The patient states that his anxiety is much lower. He changed the timing of taking his medication; he went from late at night to early morning and then early afternoon. He feels significantly better. He moved into his new house. He feels like his mood is good. He feels like he is allison to be where he is.    Prediabetes  The patient's hemoglobin A1c on 08/02/2023 was 6.2 percent. His hemoglobin A1c has been hovering in the range of prediabetes. He has been trying to control his weight. He is aware of his carbohydrate intake.     Hyperlipidemia  The patient's laboratory work on 08/02/2023 showed a normal total cholesterol level of 123 mg/dL, an LDL of 23 mg/dL, and triglycerides of 126 mg/dL. His HDL was low at 34 mg/dL. He has been taking rosuvastatin.     Right ear pain  The patient experiences otalgia in his right ear. He believes that he might have an infection. He feels that his right ear is swollen. He denies any congestion or having a foul discharge.    General health maintenance  The patient has lost weight. He weighs 160 pounds today compared to his weight on 01/2023 which was 155 pounds. His  blood pressure today is within normal limits. He is going to start working out twice a week with his  at the [facility]. He works 2 days, but it is usually about 4 to 5 hours. He does a lot of different things, like lifting things in bags and getting cards. He tries to keep himself busy. He currently has no pain. His back hurt last night, 08/02/2023, but he notes that it resolves on its own. He denies having any problems with shortness of breath or pain in his chest. He has been trying to eat in a healthier way. He has been trying to substitute his red meat with chicken. He has been eating vegetables. He would try to drink more water.     CHRONIC CONDITIONS    The following portions of the patient's history were reviewed and updated as appropriate: allergies, current medications, past family history, past medical history, past social history, past surgical history, and problem list.    Outpatient Medications Prior to Visit   Medication Sig Dispense Refill    busPIRone (BUSPAR) 30 MG tablet Take 1.5 tablets by mouth Every 12 (Twelve) Hours.      clonazePAM (KlonoPIN) 1 MG tablet Take 0.5 tablets by mouth As Needed.      fluocinonide (LIDEX) 0.05 % external solution Apply 1 application topically to the appropriate area as directed Daily As Needed. scalp      fluvoxaMINE (LUVOX) 100 MG tablet Take 1.5 tablets by mouth 2 (Two) Times a Day.      guanFACINE (TENEX) 1 MG tablet Take  by mouth Every 12 (Twelve) Hours.      hydrOXYzine (ATARAX) 50 MG tablet Take 1 tablet by mouth Daily As Needed.      omeprazole (priLOSEC) 20 MG capsule TAKE ONE CAPSULE BY MOUTH DAILY (Patient taking differently: Take 1 capsule by mouth Daily As Needed.) 30 capsule 5    rosuvastatin (Crestor) 10 MG tablet Take 1 tablet by mouth Daily. 90 tablet 3    ziprasidone (GEODON) 80 MG capsule Take 1 capsule by mouth 2 (Two) Times a Day With Meals.      melatonin 5 MG tablet tablet Take 2 tablets by mouth At Night As Needed.       No  facility-administered medications prior to visit.       Patient Active Problem List   Diagnosis    Anxiety state    Obsessive compulsive disorder    Annual physical exam    Moderate episode of recurrent major depressive disorder    Chronic kidney disease, stage 2 (mild)    Hyperlipidemia    Allergic rhinitis    Asperger's disorder    Prediabetes    Attention deficit disorder of childhood with hyperactivity       Advance Care Planning:  ACP discussion was held with the patient during this visit. Patient has an advance directive in EMR which is still valid.     Identification of Risk Factors:  Risk factors include: Advance Directive Discussion.    Review of Systems   Constitutional:  Negative for chills, fatigue and fever.   HENT:  Negative for congestion, ear pain and sinus pressure.    Respiratory:  Negative for cough, chest tightness, shortness of breath and wheezing.    Cardiovascular:  Negative for chest pain and palpitations.   Gastrointestinal:  Negative for abdominal pain, blood in stool and constipation.   Skin:  Negative for color change.   Allergic/Immunologic: Negative for environmental allergies.   Neurological:  Negative for dizziness, speech difficulty and headaches.   Psychiatric/Behavioral:  Negative for confusion. The patient is not nervous/anxious.      Compared to one year ago, the patient feels his physical health is the same.  Compared to one year ago, the patient feels his mental health is the same.    Objective     Physical Exam  Vitals reviewed.   Constitutional:       Appearance: He is well-developed.      Comments: He is overweight.   HENT:      Head: Normocephalic and atraumatic.   Eyes:      Pupils: Pupils are equal, round, and reactive to light.   Cardiovascular:      Rate and Rhythm: Normal rate and regular rhythm.      Heart sounds: Normal heart sounds.   Pulmonary:      Effort: Pulmonary effort is normal.      Breath sounds: Normal breath sounds.   Abdominal:      General: Bowel sounds  "are normal.      Palpations: Abdomen is soft.   Musculoskeletal:         General: Normal range of motion.      Cervical back: Normal range of motion.   Skin:     General: Skin is warm and dry.   Neurological:      Mental Status: He is alert and oriented to person, place, and time.        Procedures     Vitals:    08/03/23 0839   BP: 108/76   BP Location: Left arm   Patient Position: Sitting   Cuff Size: Adult   Pulse: 84   Temp: 97.8 øF (36.6 øC)   Weight: 72.6 kg (160 lb)   Height: 163 cm (64.17\")   PainSc:   2   PainLoc: Back       BMI is >= 25 and <30. (Overweight) The following options were offered after discussion;: exercise counseling/recommendations and nutrition counseling/recommendations      Assessment & Plan   Problem List Items Addressed This Visit          Cardiac and Vasculature    Hyperlipidemia    Relevant Medications    rosuvastatin (Crestor) 10 MG tablet       Endocrine and Metabolic    Prediabetes       Health Encounters    Annual physical exam - Primary       Mental Health    Moderate episode of recurrent major depressive disorder    Relevant Medications    ziprasidone (GEODON) 80 MG capsule    busPIRone (BUSPAR) 30 MG tablet    fluvoxaMINE (LUVOX) 100 MG tablet    hydrOXYzine (ATARAX) 50 MG tablet    Attention deficit disorder of childhood with hyperactivity    Relevant Medications    ziprasidone (GEODON) 80 MG capsule    busPIRone (BUSPAR) 30 MG tablet    fluvoxaMINE (LUVOX) 100 MG tablet    hydrOXYzine (ATARAX) 50 MG tablet     Patient Self-Management and Personalized Health Advice  The patient has been provided with information about: diet and exercise and preventive services including:   Annual Wellness Visit (AWV).    Outpatient Encounter Medications as of 8/3/2023   Medication Sig Dispense Refill    busPIRone (BUSPAR) 30 MG tablet Take 1.5 tablets by mouth Every 12 (Twelve) Hours.      clonazePAM (KlonoPIN) 1 MG tablet Take 0.5 tablets by mouth As Needed.      fluocinonide (LIDEX) 0.05 % " external solution Apply 1 application topically to the appropriate area as directed Daily As Needed. scalp      fluvoxaMINE (LUVOX) 100 MG tablet Take 1.5 tablets by mouth 2 (Two) Times a Day.      guanFACINE (TENEX) 1 MG tablet Take  by mouth Every 12 (Twelve) Hours.      hydrOXYzine (ATARAX) 50 MG tablet Take 1 tablet by mouth Daily As Needed.      omeprazole (priLOSEC) 20 MG capsule TAKE ONE CAPSULE BY MOUTH DAILY (Patient taking differently: Take 1 capsule by mouth Daily As Needed.) 30 capsule 5    rosuvastatin (Crestor) 10 MG tablet Take 1 tablet by mouth Daily. 90 tablet 3    ziprasidone (GEODON) 80 MG capsule Take 1 capsule by mouth 2 (Two) Times a Day With Meals.      [DISCONTINUED] melatonin 5 MG tablet tablet Take 2 tablets by mouth At Night As Needed.       No facility-administered encounter medications on file as of 8/3/2023.       Reviewed use of high risk medication in the elderly: yes  Reviewed for potential of harmful drug interactions in the elderly: yes    Follow Up:  Return in about 6 months (around 2/3/2024).     There are no Patient Instructions on file for this visit.    1. Prevention  Overall, he is doing well, given his mental health challenges. Weight loss is discussed below. He is fully vaccinated against COVID-19.    2. Hyperlipidemia  The patient's apolipoprotein B is pending, but LDL is much improved with rosuvastatin. His HDL remains low.    3. Prediabetes  The patient's hemoglobin A1c is 6.2 percent, which is up from 5.8 percent. We discussed the importance of reducing simple carbohydrates and went into detail on, specifically, sweets and foods made out of white flour.    4. Depression and ADD  He will continue to follow up with his psychiatrist.    The patient will follow up in 6 months.    An After Visit Summary and PPPS with all of these plans were given to the patient.         Transcribed from ambient dictation for Moe Gold MD by Dionne Montenegro.  08/03/23   10:31  EDT    Patient or patient representative verbalized consent to the visit recording.  I have personally performed the services described in this document as transcribed by the above individual, and it is both accurate and complete.

## 2023-08-11 LAB
APO B SERPL-MCNC: NORMAL MG/DL
REQUEST PROBLEM: NORMAL

## 2023-08-14 RX ORDER — ROSUVASTATIN CALCIUM 10 MG/1
10 TABLET, COATED ORAL DAILY
Qty: 90 TABLET | Refills: 3 | Status: SHIPPED | OUTPATIENT
Start: 2023-08-14

## 2023-08-22 RX ORDER — AMOXICILLIN 500 MG/1
500 CAPSULE ORAL 2 TIMES DAILY
Qty: 14 CAPSULE | Refills: 0 | Status: SHIPPED | OUTPATIENT
Start: 2023-08-22

## 2023-09-15 ENCOUNTER — PATIENT MESSAGE (OUTPATIENT)
Dept: INTERNAL MEDICINE | Facility: CLINIC | Age: 38
End: 2023-09-15

## 2023-09-18 NOTE — TELEPHONE ENCOUNTER
From: Ever Freeman  To: Moe Gold  Sent: 9/15/2023 6:41 PM EDT  Subject: Covid booster    Toya Gold I heard the new covid booster has been approved by fda.i plan to get that shot as well.feel fairly good after my sinus infection went away.i also celebrated 38 years of life yesterday Sept 14th.i will be in touch if I need anything else

## 2023-10-17 RX ORDER — ROSUVASTATIN CALCIUM 10 MG/1
10 TABLET, COATED ORAL DAILY
Qty: 90 TABLET | Refills: 3 | Status: SHIPPED | OUTPATIENT
Start: 2023-10-17

## 2023-11-10 ENCOUNTER — OFFICE VISIT (OUTPATIENT)
Dept: INTERNAL MEDICINE | Facility: CLINIC | Age: 38
End: 2023-11-10
Payer: MEDICARE

## 2023-11-10 VITALS
TEMPERATURE: 98.2 F | SYSTOLIC BLOOD PRESSURE: 124 MMHG | DIASTOLIC BLOOD PRESSURE: 86 MMHG | HEART RATE: 86 BPM | BODY MASS INDEX: 26.19 KG/M2 | WEIGHT: 153.4 LBS | HEIGHT: 64 IN

## 2023-11-10 DIAGNOSIS — B02.9 HERPES ZOSTER WITHOUT COMPLICATION: Primary | ICD-10-CM

## 2023-11-10 PROCEDURE — 99213 OFFICE O/P EST LOW 20 MIN: CPT | Performed by: INTERNAL MEDICINE

## 2023-11-10 PROCEDURE — 1160F RVW MEDS BY RX/DR IN RCRD: CPT | Performed by: INTERNAL MEDICINE

## 2023-11-10 PROCEDURE — 1159F MED LIST DOCD IN RCRD: CPT | Performed by: INTERNAL MEDICINE

## 2023-11-10 RX ORDER — PREDNISONE 10 MG/1
20 TABLET ORAL DAILY
Qty: 14 TABLET | Refills: 0 | Status: SHIPPED | OUTPATIENT
Start: 2023-11-10

## 2023-11-10 RX ORDER — VALACYCLOVIR HYDROCHLORIDE 1 G/1
1000 TABLET, FILM COATED ORAL 3 TIMES DAILY
Qty: 21 TABLET | Refills: 0 | Status: SHIPPED | OUTPATIENT
Start: 2023-11-10

## 2023-11-10 NOTE — PROGRESS NOTES
[facility]     [facility]  1985   [id number]      Patient Care Team:  Moe Gold MD as PCP - General  Moe Gold MD as PCP - Family Medicine    Chief Complaint::   Chief Complaint   Patient presents with    Rash     Possible shingles        HPI  The patient comes in complaining of a rash.    Rash  He reports that he had pain in his left shoulder. He admits that he took 2 extra strength Tylenol this morning and he developed a rash on his arm. He confirms that he has not been touching the rash. He started using hydrocortisone cream as per intruction but developed allergic reaction and the rash worsened.     Chronic Conditions:      Patient Active Problem List   Diagnosis    Anxiety state    Obsessive compulsive disorder    Annual physical exam    Moderate episode of recurrent major depressive disorder    Chronic kidney disease, stage 2 (mild)    Hyperlipidemia    Allergic rhinitis    Asperger's disorder    Prediabetes    Attention deficit disorder of childhood with hyperactivity        Past Medical History:   Diagnosis Date    ADHD (attention deficit hyperactivity disorder) 1/1/1989    I dont remember exact date i was diagnosed    Alcohol abuse     Allergic     Demoral    Anxiety     Asperger's syndrome     Cholelithiasis 5/15/2015    Had surgery to remove gallbladder in 2015    Neurocardiogenic syncope     OCD (obsessive compulsive disorder)     Severe hearing loss     Hearing loss, severe right sensorineural       Past Surgical History:   Procedure Laterality Date    CHOLECYSTECTOMY  09/28/2015    TYMPANOSTOMY  1986       Family History   Problem Relation Age of Onset    Alcohol abuse Son     Anxiety disorder Son        Social History     Socioeconomic History    Marital status: Single   Tobacco Use    Smoking status: Former     Packs/day: 1.00     Years: 0.00     Additional pack years: 0.00     Total pack years: 0.00     Types: Cigars, Cigarettes     Quit date:  2020     Years since quitting: 3.8    Smokeless tobacco: Never    Tobacco comments:     not daily   Vaping Use    Vaping Use: Never used   Substance and Sexual Activity    Alcohol use: Not Currently     Alcohol/week: 3.0 standard drinks of alcohol     Types: 2 Shots of liquor, 1 Drinks containing 0.5 oz of alcohol per week     Comment: Most intermittently i would say quit hard liquor last year,    Drug use: No    Sexual activity: Not Currently     Partners: Female, Male     Birth control/protection: Essure, Bilateral salpingectomy        Allergies   Allergen Reactions    Demerol [Meperidine] Hives         Current Outpatient Medications:     busPIRone (BUSPAR) 30 MG tablet, Take 1.5 tablets by mouth Every 12 (Twelve) Hours., Disp: , Rfl:     clonazePAM (KlonoPIN) 1 MG tablet, Take 0.5 tablets by mouth As Needed., Disp: , Rfl:     fluocinonide (LIDEX) 0.05 % external solution, Apply 1 application topically to the appropriate area as directed Daily As Needed. scalp, Disp: , Rfl:     fluvoxaMINE (LUVOX) 100 MG tablet, Take 1.5 tablets by mouth 2 (Two) Times a Day., Disp: , Rfl:     guanFACINE (TENEX) 1 MG tablet, Take  by mouth Every 12 (Twelve) Hours., Disp: , Rfl:     hydrOXYzine (ATARAX) 50 MG tablet, Take 1 tablet by mouth Daily As Needed., Disp: , Rfl:     omeprazole (priLOSEC) 20 MG capsule, TAKE ONE CAPSULE BY MOUTH DAILY (Patient taking differently: Take 1 capsule by mouth Daily As Needed.), Disp: 30 capsule, Rfl: 5    rosuvastatin (Crestor) 10 MG tablet, Take 1 tablet by mouth Daily., Disp: 90 tablet, Rfl: 3    ziprasidone (GEODON) 80 MG capsule, Take 1 capsule by mouth 2 (Two) Times a Day With Meals., Disp: , Rfl:     predniSONE (DELTASONE) 10 MG tablet, Take 2 tablets by mouth Daily., Disp: 14 tablet, Rfl: 0    valACYclovir (Valtrex) 1000 MG tablet, Take 1 tablet by mouth 3 (Three) Times a Day., Disp: 21 tablet, Rfl: 0    Review of Systems   Constitutional: Negative.    Respiratory: Negative.  Negative for  "chest tightness and shortness of breath.    Cardiovascular: Negative.  Negative for chest pain.   Gastrointestinal:  Negative for abdominal pain, blood in stool, constipation and diarrhea.        Vital Signs  Vitals:    11/10/23 1336   BP: 124/86   BP Location: Right arm   Patient Position: Sitting   Cuff Size: Adult   Pulse: 86   Temp: 98.2 °F (36.8 °C)   Weight: 69.6 kg (153 lb 6.4 oz)   Height: 163 cm (64.17\")   PainSc: 0-No pain       Physical Exam  Constitutional:       General: He is not in acute distress.     Appearance: Normal appearance.   Skin:     Findings: Rash present.      Comments: He has a blistering rash extending from the midline of his back around the left side of his chest to midline at the sternum at the T4 dermatome.   Neurological:      Mental Status: He is alert.          Procedures    ACE III MINI             Assessment/Plan:    Diagnoses and all orders for this visit:    1. Herpes zoster without complication (Primary)    Other orders  -     valACYclovir (Valtrex) 1000 MG tablet; Take 1 tablet by mouth 3 (Three) Times a Day.  Dispense: 21 tablet; Refill: 0  -     predniSONE (DELTASONE) 10 MG tablet; Take 2 tablets by mouth Daily.  Dispense: 14 tablet; Refill: 0        Herpes zoster  - He has an extensive rash that generally would cause considerable pain. Fortunately, he does not have much pain, but we will go ahead and treat with valacyclovir 1000 mg 3 times a day and prednisone 20 mg a day for 7 days.    Plan of care reviewed with patient at the conclusion of today's visit. Education was provided regarding diagnosis, management, and any prescribed or recommended OTC medications.Patient verbalizes understanding of and agreement with management plan.         Moe Gold MD     Transcribed from ambient dictation for Moe Gold MD by yRan Costa.  11/10/23   16:42 EST    Patient or patient representative verbalized consent to the visit recording.  I have personally " performed the services described in this document as transcribed by the above individual, and it is both accurate and complete.

## 2024-01-09 RX ORDER — ROSUVASTATIN CALCIUM 10 MG/1
10 TABLET, COATED ORAL DAILY
Qty: 90 TABLET | Refills: 3 | OUTPATIENT
Start: 2024-01-09

## 2024-01-31 ENCOUNTER — TELEMEDICINE (OUTPATIENT)
Dept: INTERNAL MEDICINE | Facility: CLINIC | Age: 39
End: 2024-01-31
Payer: MEDICARE

## 2024-01-31 DIAGNOSIS — N18.2 CHRONIC KIDNEY DISEASE, STAGE 2 (MILD): ICD-10-CM

## 2024-01-31 DIAGNOSIS — R73.03 PREDIABETES: ICD-10-CM

## 2024-01-31 DIAGNOSIS — E78.2 MIXED HYPERLIPIDEMIA: Primary | ICD-10-CM

## 2024-01-31 PROCEDURE — 99214 OFFICE O/P EST MOD 30 MIN: CPT | Performed by: INTERNAL MEDICINE

## 2024-01-31 NOTE — PROGRESS NOTES
[facility]     [facility]  1985   [id number]      Patient Care Team:  Moe Gold MD as PCP - General  Moe Gold MD as PCP - Family Medicine    Chief Complaint::   Chief Complaint   Patient presents with    Hyperlipidemia      You have chosen to receive care through a telehealth visit.  Do you consent to use a video/audio connection for your medical care today? Yes    Video visit was performed from my office in [facility] with the patient in his home in [facility].    HPI  The patient comes in for follow-up of hyperlipidemia, prediabetes, and chronic kidney disease.    Hyperlipidemia  The patient reports he has been doing well, indicating satisfactory activity levels, adequate nocturnal sleep, and stable weight at 155 pounds. He denies respiratory difficulties or chest pains.    Shingles  The patient reports ongoing management of shingles. He indicates having shared a visual assessment with Melva Amador's , who conveyed improvement.    Chronic Conditions:  Hyperlipidemia, prediabetes, chronic kidney disease.    Patient Active Problem List   Diagnosis    Anxiety state    Obsessive compulsive disorder    Annual physical exam    Moderate episode of recurrent major depressive disorder    Chronic kidney disease, stage 2 (mild)    Hyperlipidemia    Allergic rhinitis    Asperger's disorder    Prediabetes    Attention deficit disorder of childhood with hyperactivity        Past Medical History:   Diagnosis Date    ADHD (attention deficit hyperactivity disorder) 1/1/1989    I dont remember exact date i was diagnosed    Alcohol abuse     Allergic     Demoral    Anxiety     Asperger's syndrome     Cholelithiasis 5/15/2015    Had surgery to remove gallbladder in 2015    Neurocardiogenic syncope     OCD (obsessive compulsive disorder)     Severe hearing loss     Hearing loss, severe right sensorineural       Past Surgical History:   Procedure Laterality  Date    CHOLECYSTECTOMY  2015    TYMPANOSTOMY  1986       Family History   Problem Relation Age of Onset    Alcohol abuse Son     Anxiety disorder Son        Social History     Socioeconomic History    Marital status: Single   Tobacco Use    Smoking status: Former     Packs/day: 1.00     Years: 0.00     Additional pack years: 0.00     Total pack years: 0.00     Types: Cigars, Cigarettes     Quit date:      Years since quittin.0    Smokeless tobacco: Never    Tobacco comments:     not daily   Vaping Use    Vaping Use: Never used   Substance and Sexual Activity    Alcohol use: Not Currently     Alcohol/week: 3.0 standard drinks of alcohol     Types: 2 Shots of liquor, 1 Drinks containing 0.5 oz of alcohol per week     Comment: Most intermittently i would say quit hard liquor last year,    Drug use: No    Sexual activity: Not Currently     Partners: Female, Male     Birth control/protection: Essure, Bilateral salpingectomy        Allergies   Allergen Reactions    Demerol [Meperidine] Hives         Current Outpatient Medications:     busPIRone (BUSPAR) 30 MG tablet, Take 1.5 tablets by mouth Every 12 (Twelve) Hours., Disp: , Rfl:     clonazePAM (KlonoPIN) 1 MG tablet, Take 0.5 tablets by mouth As Needed., Disp: , Rfl:     fluocinonide (LIDEX) 0.05 % external solution, Apply 1 application topically to the appropriate area as directed Daily As Needed. scalp, Disp: , Rfl:     fluvoxaMINE (LUVOX) 100 MG tablet, Take 1.5 tablets by mouth 2 (Two) Times a Day., Disp: , Rfl:     guanFACINE (TENEX) 1 MG tablet, Take  by mouth Every 12 (Twelve) Hours., Disp: , Rfl:     hydrOXYzine (ATARAX) 50 MG tablet, Take 1 tablet by mouth Daily As Needed., Disp: , Rfl:     omeprazole (priLOSEC) 20 MG capsule, TAKE ONE CAPSULE BY MOUTH DAILY (Patient taking differently: Take 1 capsule by mouth Daily As Needed.), Disp: 30 capsule, Rfl: 5    predniSONE (DELTASONE) 10 MG tablet, Take 2 tablets by mouth Daily., Disp: 14 tablet, Rfl:  0    rosuvastatin (Crestor) 10 MG tablet, Take 1 tablet by mouth Daily., Disp: 90 tablet, Rfl: 3    valACYclovir (Valtrex) 1000 MG tablet, Take 1 tablet by mouth 3 (Three) Times a Day., Disp: 21 tablet, Rfl: 0    ziprasidone (GEODON) 80 MG capsule, Take 1 capsule by mouth 2 (Two) Times a Day With Meals., Disp: , Rfl:     Review of Systems   Constitutional:  Negative for chills, fatigue and fever.   HENT:  Negative for congestion, ear pain and sinus pressure.    Respiratory:  Negative for cough, chest tightness, shortness of breath and wheezing.    Cardiovascular:  Negative for chest pain and palpitations.   Gastrointestinal:  Negative for abdominal pain, blood in stool and constipation.   Skin:  Negative for color change.   Allergic/Immunologic: Negative for environmental allergies.   Neurological:  Negative for dizziness, speech difficulty and headache.   Psychiatric/Behavioral:  Negative for decreased concentration. The patient is not nervous/anxious.         Vital Signs  There were no vitals filed for this visit.    Physical Exam  Constitutional:       Appearance: Normal appearance.   HENT:      Head: Normocephalic and atraumatic.   Eyes:      Extraocular Movements: Extraocular movements intact.      Pupils: Pupils are equal, round, and reactive to light.   Pulmonary:      Effort: Pulmonary effort is normal. No respiratory distress.   Neurological:      Mental Status: He is alert and oriented to person, place, and time.      Cranial Nerves: No cranial nerve deficit.   Psychiatric:         Mood and Affect: Mood normal.         Behavior: Behavior normal.          Procedures    ACE III MINI             Assessment/Plan:    Diagnoses and all orders for this visit:    1. Mixed hyperlipidemia (Primary)  -     Apolipoprotein B; Future  -     Comprehensive Metabolic Panel; Future  -     Lipid Panel; Future    2. Prediabetes  -     Hemoglobin A1c; Future    3. Chronic kidney disease, stage 2 (mild)      1.  Hyperlipidemia  - Lipid panel is pending. If apolipoprotein B is above 100, he will need to have an increased dose of rosuvastatin. He will continue efforts at healthy diet and weight loss.    2. Prediabetes  - Hemoglobin A1c is pending. The treatment at this point remains healthy diet, carb restriction, and avoidance of weight gain.    3. Chronic kidney disease  - GFR is pending. The treatment is control of blood pressure, glucose, and avoidance of NSAIDs.    Follow up in 6 months.    Plan of care reviewed with patient at the conclusion of today's visit. Education was provided regarding diagnosis, management, and any prescribed or recommended OTC medications.Patient verbalizes understanding of and agreement with management plan.         Moe Gold MD       Transcribed from ambient dictation for Moe Gold MD by Roxana Henriquez.  01/31/24   14:32 EST    Patient or patient representative verbalized consent to the visit recording.  I have personally performed the services described in this document as transcribed by the above individual, and it is both accurate and complete.

## 2024-02-06 ENCOUNTER — TELEPHONE (OUTPATIENT)
Dept: INTERNAL MEDICINE | Facility: CLINIC | Age: 39
End: 2024-02-06
Payer: MEDICARE

## 2024-02-06 NOTE — TELEPHONE ENCOUNTER
Caller: Sugar Freeman    Relationship: Self    Best call back number: [phone]     What is the best time to reach you: ANY    Who are you requesting to speak with (clinical staff, provider,  specific staff member): CLINICAL STAFF    Do you know the name of the person who called: SUGAR    What was the call regarding: PATIENT STATED THAT HE IS HAVING DIFFICULTY SECURING TRANSPORTATION AND THAT IT WOULD LIKELY BE ANOTHER WEEK OR 2 BEFORE HE IS ABLE TO COMPLETE THE LAB WORK THAT DR IRENE ORDERED FOR HIM ON 1/31/24.    IS IT OKAY IF HE WAITS TO HAVE THIS LAB WORK COMPLETED?    PLEASE ADVISE PATIENT

## 2024-02-16 ENCOUNTER — TELEPHONE (OUTPATIENT)
Dept: INTERNAL MEDICINE | Facility: CLINIC | Age: 39
End: 2024-02-16

## 2024-02-16 ENCOUNTER — LAB (OUTPATIENT)
Dept: LAB | Facility: HOSPITAL | Age: 39
End: 2024-02-16
Payer: MEDICARE

## 2024-02-16 DIAGNOSIS — R73.03 PREDIABETES: ICD-10-CM

## 2024-02-16 DIAGNOSIS — E78.2 MIXED HYPERLIPIDEMIA: ICD-10-CM

## 2024-02-16 LAB
ALBUMIN SERPL-MCNC: 4.5 G/DL (ref 3.5–5.2)
ALBUMIN/GLOB SERPL: 1.2 G/DL
ALP SERPL-CCNC: 54 U/L (ref 39–117)
ALT SERPL W P-5'-P-CCNC: 50 U/L (ref 1–41)
ANION GAP SERPL CALCULATED.3IONS-SCNC: 13 MMOL/L (ref 5–15)
AST SERPL-CCNC: 31 U/L (ref 1–40)
BILIRUB SERPL-MCNC: 0.4 MG/DL (ref 0–1.2)
BUN SERPL-MCNC: 8 MG/DL (ref 6–20)
BUN/CREAT SERPL: 6.4 (ref 7–25)
CALCIUM SPEC-SCNC: 9.6 MG/DL (ref 8.6–10.5)
CHLORIDE SERPL-SCNC: 98 MMOL/L (ref 98–107)
CHOLEST SERPL-MCNC: 144 MG/DL (ref 0–200)
CO2 SERPL-SCNC: 28 MMOL/L (ref 22–29)
CREAT SERPL-MCNC: 1.25 MG/DL (ref 0.76–1.27)
EGFRCR SERPLBLD CKD-EPI 2021: 75.6 ML/MIN/1.73
GLOBULIN UR ELPH-MCNC: 3.7 GM/DL
GLUCOSE SERPL-MCNC: 111 MG/DL (ref 65–99)
HBA1C MFR BLD: 6 % (ref 4.8–5.6)
HDLC SERPL-MCNC: 35 MG/DL (ref 40–60)
LDLC SERPL CALC-MCNC: 74 MG/DL (ref 0–100)
LDLC/HDLC SERPL: 1.93 {RATIO}
POTASSIUM SERPL-SCNC: 3.8 MMOL/L (ref 3.5–5.2)
PROT SERPL-MCNC: 8.2 G/DL (ref 6–8.5)
SODIUM SERPL-SCNC: 139 MMOL/L (ref 136–145)
TRIGL SERPL-MCNC: 207 MG/DL (ref 0–150)
VLDLC SERPL-MCNC: 35 MG/DL (ref 5–40)

## 2024-02-16 PROCEDURE — 82172 ASSAY OF APOLIPOPROTEIN: CPT

## 2024-02-16 PROCEDURE — 83036 HEMOGLOBIN GLYCOSYLATED A1C: CPT

## 2024-02-16 PROCEDURE — 80053 COMPREHEN METABOLIC PANEL: CPT

## 2024-02-16 PROCEDURE — 80061 LIPID PANEL: CPT

## 2024-02-16 PROCEDURE — 36415 COLL VENOUS BLD VENIPUNCTURE: CPT

## 2024-02-16 NOTE — TELEPHONE ENCOUNTER
Caller: Ever Freeman    Relationship: Self    Best call back number: [phone]     What is the best time to reach you: ANY    Who are you requesting to speak with (clinical staff, provider,  specific staff member): DR. IRENE    What was the call regarding: THE PATIENT WANTED TO LET DR. IRENE KNOW THAT HE GAVE BLOOD THIS MORNING, HE FASTED FOR 8 HOURS AND THEY T[facility] 3 [facility]. HE DID NOT HAVE ANY TROUBLE GIVING BLOOD. HE IS AT HOME RESTING NOW.     Is it okay if the provider responds through MyChart: NO

## 2024-02-20 LAB — APO B SERPL-MCNC: 94 MG/DL

## 2024-03-19 ENCOUNTER — TELEPHONE (OUTPATIENT)
Dept: INTERNAL MEDICINE | Facility: CLINIC | Age: 39
End: 2024-03-19
Payer: MEDICARE

## 2024-03-19 NOTE — TELEPHONE ENCOUNTER
----- Message from Hailee Ball LPN sent at 3/19/2024  8:20 AM EDT -----  Regarding: FW: Next appointment   Contact: [phone]    ----- Message -----  From: Ever Freeman  Sent: 3/18/2024   7:50 PM EDT  To: [facility] [address]  Subject: Next appointment                                 Hi Dr Alla bennett feeling  much better I wanted to confirm when you want me to see you again?

## 2024-07-24 ENCOUNTER — TELEPHONE (OUTPATIENT)
Dept: INTERNAL MEDICINE | Facility: CLINIC | Age: 39
End: 2024-07-24
Payer: MEDICARE

## 2024-07-24 NOTE — TELEPHONE ENCOUNTER
PT IS CALLING TO HAVE HIS ANNUAL LABS ORDERED FOR HIS UPCOMING APPT AND WANTS TO MAKE SURE THIS IS A FASTING APPT

## 2024-07-26 DIAGNOSIS — E78.2 MIXED HYPERLIPIDEMIA: Primary | ICD-10-CM

## 2024-07-26 DIAGNOSIS — R73.03 PREDIABETES: ICD-10-CM

## 2024-07-26 DIAGNOSIS — Z13.0 SCREENING FOR DEFICIENCY ANEMIA: ICD-10-CM

## 2024-08-12 ENCOUNTER — TELEPHONE (OUTPATIENT)
Dept: INTERNAL MEDICINE | Facility: CLINIC | Age: 39
End: 2024-08-12
Payer: MEDICARE

## 2024-08-12 DIAGNOSIS — M54.50 CHRONIC MIDLINE LOW BACK PAIN WITHOUT SCIATICA: Primary | ICD-10-CM

## 2024-08-12 DIAGNOSIS — G89.29 CHRONIC MIDLINE LOW BACK PAIN WITHOUT SCIATICA: Primary | ICD-10-CM

## 2024-08-12 NOTE — TELEPHONE ENCOUNTER
----- Message from Yi YOON sent at 8/12/2024 10:57 AM EDT -----  Regarding: FW: Back muscles hurting  Contact: [phone]    ----- Message -----  From: Ever Freeman  Sent: 8/12/2024  10:56 AM EDT  To: [facility] [address]  Subject: Back muscles hurting                             She said [facility] in [place] let me know if you need anything else

## 2024-08-22 ENCOUNTER — LAB (OUTPATIENT)
Dept: LAB | Facility: HOSPITAL | Age: 39
End: 2024-08-22
Payer: MEDICARE

## 2024-08-22 DIAGNOSIS — R73.03 PREDIABETES: ICD-10-CM

## 2024-08-22 DIAGNOSIS — Z13.0 SCREENING FOR DEFICIENCY ANEMIA: ICD-10-CM

## 2024-08-22 DIAGNOSIS — E78.2 MIXED HYPERLIPIDEMIA: ICD-10-CM

## 2024-08-22 LAB
ALBUMIN SERPL-MCNC: 4.7 G/DL (ref 3.5–5.2)
ALBUMIN/GLOB SERPL: 1.6 G/DL
ALP SERPL-CCNC: 54 U/L (ref 39–117)
ALT SERPL W P-5'-P-CCNC: 32 U/L (ref 1–41)
ANION GAP SERPL CALCULATED.3IONS-SCNC: 13 MMOL/L (ref 5–15)
AST SERPL-CCNC: 23 U/L (ref 1–40)
BASOPHILS # BLD AUTO: 0.08 10*3/MM3 (ref 0–0.2)
BASOPHILS NFR BLD AUTO: 0.7 % (ref 0–1.5)
BILIRUB SERPL-MCNC: 0.3 MG/DL (ref 0–1.2)
BUN SERPL-MCNC: 14 MG/DL (ref 6–20)
BUN/CREAT SERPL: 9.3 (ref 7–25)
CALCIUM SPEC-SCNC: 9.9 MG/DL (ref 8.6–10.5)
CHLORIDE SERPL-SCNC: 101 MMOL/L (ref 98–107)
CHOLEST SERPL-MCNC: 128 MG/DL (ref 0–200)
CO2 SERPL-SCNC: 25 MMOL/L (ref 22–29)
CREAT SERPL-MCNC: 1.51 MG/DL (ref 0.76–1.27)
DEPRECATED RDW RBC AUTO: 41.1 FL (ref 37–54)
EGFRCR SERPLBLD CKD-EPI 2021: 60.3 ML/MIN/1.73
EOSINOPHIL # BLD AUTO: 0.13 10*3/MM3 (ref 0–0.4)
EOSINOPHIL NFR BLD AUTO: 1.2 % (ref 0.3–6.2)
ERYTHROCYTE [DISTWIDTH] IN BLOOD BY AUTOMATED COUNT: 12.9 % (ref 12.3–15.4)
GLOBULIN UR ELPH-MCNC: 2.9 GM/DL
GLUCOSE SERPL-MCNC: 101 MG/DL (ref 65–99)
HBA1C MFR BLD: 5.9 % (ref 4.8–5.6)
HCT VFR BLD AUTO: 48.8 % (ref 37.5–51)
HDLC SERPL-MCNC: 33 MG/DL (ref 40–60)
HGB BLD-MCNC: 16.2 G/DL (ref 13–17.7)
IMM GRANULOCYTES # BLD AUTO: 0.02 10*3/MM3 (ref 0–0.05)
IMM GRANULOCYTES NFR BLD AUTO: 0.2 % (ref 0–0.5)
LDLC SERPL CALC-MCNC: 71 MG/DL (ref 0–100)
LDLC/HDLC SERPL: 2.05 {RATIO}
LYMPHOCYTES # BLD AUTO: 5.02 10*3/MM3 (ref 0.7–3.1)
LYMPHOCYTES NFR BLD AUTO: 46 % (ref 19.6–45.3)
MCH RBC QN AUTO: 29.2 PG (ref 26.6–33)
MCHC RBC AUTO-ENTMCNC: 33.2 G/DL (ref 31.5–35.7)
MCV RBC AUTO: 88.1 FL (ref 79–97)
MONOCYTES # BLD AUTO: 0.67 10*3/MM3 (ref 0.1–0.9)
MONOCYTES NFR BLD AUTO: 6.1 % (ref 5–12)
NEUTROPHILS NFR BLD AUTO: 45.8 % (ref 42.7–76)
NEUTROPHILS NFR BLD AUTO: 5 10*3/MM3 (ref 1.7–7)
NRBC BLD AUTO-RTO: 0 /100 WBC (ref 0–0.2)
PLATELET # BLD AUTO: 207 10*3/MM3 (ref 140–450)
PMV BLD AUTO: 11.2 FL (ref 6–12)
POTASSIUM SERPL-SCNC: 3.8 MMOL/L (ref 3.5–5.2)
PROT SERPL-MCNC: 7.6 G/DL (ref 6–8.5)
RBC # BLD AUTO: 5.54 10*6/MM3 (ref 4.14–5.8)
SODIUM SERPL-SCNC: 139 MMOL/L (ref 136–145)
TRIGL SERPL-MCNC: 137 MG/DL (ref 0–150)
VLDLC SERPL-MCNC: 24 MG/DL (ref 5–40)
WBC NRBC COR # BLD AUTO: 10.92 10*3/MM3 (ref 3.4–10.8)

## 2024-08-22 PROCEDURE — 36415 COLL VENOUS BLD VENIPUNCTURE: CPT

## 2024-08-22 PROCEDURE — 80061 LIPID PANEL: CPT

## 2024-08-22 PROCEDURE — 85025 COMPLETE CBC W/AUTO DIFF WBC: CPT

## 2024-08-22 PROCEDURE — 83036 HEMOGLOBIN GLYCOSYLATED A1C: CPT

## 2024-08-22 PROCEDURE — 82172 ASSAY OF APOLIPOPROTEIN: CPT

## 2024-08-22 PROCEDURE — 80053 COMPREHEN METABOLIC PANEL: CPT

## 2024-08-23 ENCOUNTER — TELEPHONE (OUTPATIENT)
Dept: INTERNAL MEDICINE | Facility: CLINIC | Age: 39
End: 2024-08-23

## 2024-08-23 ENCOUNTER — OFFICE VISIT (OUTPATIENT)
Dept: INTERNAL MEDICINE | Facility: CLINIC | Age: 39
End: 2024-08-23
Payer: MEDICARE

## 2024-08-23 VITALS
BODY MASS INDEX: 27.93 KG/M2 | WEIGHT: 157.6 LBS | OXYGEN SATURATION: 97 % | SYSTOLIC BLOOD PRESSURE: 110 MMHG | HEIGHT: 63 IN | DIASTOLIC BLOOD PRESSURE: 82 MMHG | HEART RATE: 90 BPM | TEMPERATURE: 98.7 F

## 2024-08-23 DIAGNOSIS — F84.5 ASPERGER'S DISORDER: ICD-10-CM

## 2024-08-23 DIAGNOSIS — F33.1 MODERATE EPISODE OF RECURRENT MAJOR DEPRESSIVE DISORDER: ICD-10-CM

## 2024-08-23 DIAGNOSIS — E78.2 MIXED HYPERLIPIDEMIA: ICD-10-CM

## 2024-08-23 DIAGNOSIS — Z00.00 PREVENTATIVE HEALTH CARE: Primary | ICD-10-CM

## 2024-08-23 DIAGNOSIS — G89.29 CHRONIC MIDLINE LOW BACK PAIN WITHOUT SCIATICA: Primary | ICD-10-CM

## 2024-08-23 DIAGNOSIS — R73.03 PREDIABETES: ICD-10-CM

## 2024-08-23 DIAGNOSIS — M54.50 CHRONIC MIDLINE LOW BACK PAIN WITHOUT SCIATICA: Primary | ICD-10-CM

## 2024-08-23 PROBLEM — N18.2 CHRONIC KIDNEY DISEASE, STAGE 2 (MILD): Status: RESOLVED | Noted: 2019-06-18 | Resolved: 2024-08-23

## 2024-08-23 PROCEDURE — 1160F RVW MEDS BY RX/DR IN RCRD: CPT | Performed by: INTERNAL MEDICINE

## 2024-08-23 PROCEDURE — 1126F AMNT PAIN NOTED NONE PRSNT: CPT | Performed by: INTERNAL MEDICINE

## 2024-08-23 PROCEDURE — G0439 PPPS, SUBSEQ VISIT: HCPCS | Performed by: INTERNAL MEDICINE

## 2024-08-23 PROCEDURE — 1159F MED LIST DOCD IN RCRD: CPT | Performed by: INTERNAL MEDICINE

## 2024-08-23 NOTE — TELEPHONE ENCOUNTER
Caller: Ever Freeman    Relationship: Self    Best call back number: [phone]    What is the medical concern/diagnosis: BACK PAIN     What specialty or service is being requested: PHYSICAL THERAPY     Any additional details: THE PATIENT WAS REFERRED TO [facility] BUT THEY CALLED HIM TO LET HIM KNOW THAT THEY ARE NOT IN NETWORK THE PATIENT WOULD LIKE TO BE REFERRED TO SOMEONE THAT IS IN NETWORK FOR HIM

## 2024-08-23 NOTE — PROGRESS NOTES
Subjective   The ABCs of the Annual Wellness Visit  Medicare Wellness Visit      Ever Freeman is a 38 y.o. patient who presents for a Medicare Wellness Visit.    The following portions of the patient's history were reviewed and   updated as appropriate: allergies, current medications, past family history, past medical history, past social history, past surgical history, and problem list.    Compared to one year ago, the patient's physical   health is the same.  Compared to one year ago, the patient's mental   health is the same.    Recent Hospitalizations:  He was not admitted to the hospital during the last year.     Current Medical Providers:  Patient Care Team:  Moe Gold MD as PCP - General  Moe Gold MD as PCP - Family Medicine    Outpatient Medications Prior to Visit   Medication Sig Dispense Refill    busPIRone (BUSPAR) 30 MG tablet Take 1.5 tablets by mouth Every 12 (Twelve) Hours.      clonazePAM (KlonoPIN) 1 MG tablet Take 0.5 tablets by mouth As Needed.      fluocinonide (LIDEX) 0.05 % external solution Apply 1 application topically to the appropriate area as directed Daily As Needed. scalp      fluvoxaMINE (LUVOX) 100 MG tablet Take 1.5 tablets by mouth 2 (Two) Times a Day.      guanFACINE (TENEX) 1 MG tablet Take  by mouth Every 12 (Twelve) Hours.      hydrOXYzine (ATARAX) 50 MG tablet Take 1 tablet by mouth Daily As Needed.      omeprazole (priLOSEC) 20 MG capsule TAKE ONE CAPSULE BY MOUTH DAILY (Patient taking differently: Take 1 capsule by mouth Daily As Needed.) 30 capsule 5    rosuvastatin (Crestor) 10 MG tablet Take 1 tablet by mouth Daily. 90 tablet 3    ziprasidone (GEODON) 80 MG capsule Take 1 capsule by mouth 2 (Two) Times a Day With Meals.      predniSONE (DELTASONE) 10 MG tablet Take 2 tablets by mouth Daily. 14 tablet 0    valACYclovir (Valtrex) 1000 MG tablet Take 1 tablet by mouth 3 (Three) Times a Day. 21 tablet 0     No facility-administered medications  "prior to visit.     No opioid medication identified on active medication list. I have reviewed chart for other potential  high risk medication/s and harmful drug interactions in the elderly.      Aspirin is not on active medication list.  Aspirin use is not indicated based on review of current medical condition/s. Risk of harm outweighs potential benefits.  .    Patient Active Problem List   Diagnosis    Anxiety state    Obsessive compulsive disorder    Annual physical exam    Moderate episode of recurrent major depressive disorder    Hyperlipidemia    Allergic rhinitis    Asperger's disorder    Prediabetes    Attention deficit disorder of childhood with hyperactivity     Advance Care Planning Advance Directive is not on file.  ACP discussion was held with the patient during this visit. Patient has an advance directive (not in EMR), copy requested.            Objective   Vitals:    24 1525   BP: 110/82   BP Location: Left arm   Patient Position: Sitting   Cuff Size: Adult   Pulse: 90   Temp: 98.7 °F (37.1 °C)   TempSrc: Infrared   SpO2: 97%   Weight: 71.5 kg (157 lb 9.6 oz)   Height: 160.7 cm (63.25\")   PainSc: 0-No pain       Estimated body mass index is 27.7 kg/m² as calculated from the following:    Height as of this encounter: 160.7 cm (63.25\").    Weight as of this encounter: 71.5 kg (157 lb 9.6 oz).            Does the patient have evidence of cognitive impairment? Yes  Lab Results   Component Value Date    TRIG 137 2024    HDL 33 (L) 2024    LDL 71 2024    VLDL 24 2024    HGBA1C 5.90 (H) 2024                                                                                                Health  Risk Assessment    Smoking Status:  Social History     Tobacco Use   Smoking Status Former    Current packs/day: 0.00    Average packs/day: 1 pack/day for 1 year (1.0 ttl pk-yrs)    Types: Cigarettes, Cigars    Start date:     Quit date: 2020    Years since quittin.6 "   Smokeless Tobacco Never   Tobacco Comments    Only cigars no cigarettes     Alcohol Consumption:  Social History     Substance and Sexual Activity   Alcohol Use Not Currently    Alcohol/week: 3.0 standard drinks of alcohol    Types: 2 Shots of liquor, 1 Drinks containing 0.5 oz of alcohol per week    Comment: Most intermittently i would say quit hard liquor last year,       Fall Risk Screen  MLIESADI Fall Risk Assessment was completed, and patient is at LOW risk for falls.Assessment completed on:2024    Depression Screenin/23/2024     3:27 PM   PHQ-2/PHQ-9 Depression Screening   Little Interest or Pleasure in Doing Things 0-->not at all   Feeling Down, Depressed or Hopeless 1-->several days   PHQ-9: Brief Depression Severity Measure Score 1     Health Habits and Functional and Cognitive Screenin/16/2024     7:48 AM   Functional & Cognitive Status   Do you have difficulty preparing food and eating? No   Do you have difficulty bathing yourself, getting dressed or grooming yourself? No   Do you have difficulty using the toilet? No   Do you have difficulty moving around from place to place? No   Do you have trouble with steps or getting out of a bed or a chair? No   Current Diet Limited Junk Food   Dental Exam Up to date   Eye Exam Up to date   Exercise (times per week) 2 times per week   Current Exercises Include Aerobics;Coaching;House Cleaning;Treadmill;Walking   Do you need help using the phone?  No   Are you deaf or do you have serious difficulty hearing?  Yes   Do you need help to go to places out of walking distance? Yes   Do you need help shopping? No   Do you need help preparing meals?  No   Do you need help with housework?  No   Do you need help with laundry? No   Do you need help taking your medications? No   Do you need help managing money? No   Do you ever drive or ride in a car without wearing a seat belt? No   Have you felt unusual stress, anger or loneliness in the last month? Yes    Who do you live with? Alone   If you need help, do you have trouble finding someone available to you? Yes   Have you been bothered in the last four weeks by sexual problems? No   Do you have difficulty concentrating, remembering or making decisions? Yes           Age-appropriate Screening Schedule:  Refer to the list below for future screening recommendations based on patient's age, sex and/or medical conditions. Orders for these recommended tests are listed in the plan section. The patient has been provided with a written plan.    Health Maintenance List  Health Maintenance   Topic Date Due    Hepatitis B (1 of 3 - 19+ 3-dose series) Never done    COVID-19 Vaccine (5 - 2023-24 season) 01/23/2024    ANNUAL WELLNESS VISIT  08/03/2024    BMI FOLLOWUP  08/12/2025    LIPID PANEL  08/22/2025    TDAP/TD VACCINES (2 - Td or Tdap) 10/16/2032    HEPATITIS C SCREENING  Completed    INFLUENZA VACCINE  Completed    Pneumococcal Vaccine 0-64  Aged Out                                                                                                                                                CMS Preventative Services Quick Reference  Risk Factors Identified During Encounter  None Identified    The above risks/problems have been discussed with the patient.  Pertinent information has been shared with the patient in the After Visit Summary.  An After Visit Summary and PPPS were made available to the patient.    Follow Up:   Next Medicare Wellness visit to be scheduled in 1 year.         Additional E&M Note during same encounter follows:  Patient has additional, significant, and separately identifiable condition(s)/problem(s) that require work above and beyond the Medicare Wellness Visit     Chief Complaint  Medicare Wellness-subsequent and Hyperlipidemia    Subjective    HPI         The patient is a 39-year-old male who comes in for a subsequent Medicare annual wellness examination and follow-up of hyperlipidemia and  "prediabetes.    He continues to see his mental health professional for Asperger's, depression, attention deficit disorder, and obsessive-compulsive disorder. He called his psychiatrist recently, expressing concerns about the effectiveness of his medications. He was advised to take Vistaril, but it causes him to have unusual dreams. He also spoke with his psychologist, Evaristo Bone, and will see Dr. Pollock again in 01/2025. He is struggling with the fact that his father is going to have surgery again.    His primary concern is lower back pain, which he describes as being more centralized. He has an upcoming appointment with [facility] for an evaluation. He reports no leg weakness or radiating leg pain.    He has been making an effort to increase his water intake but avoids excessive consumption due to frequent urination. He received his influenza vaccine on the 17th. He is not taking prednisone. He gets out and exercises regularly.       No opioid medication identified on active medication list. I have reviewed chart for other potential  high risk medication/s and harmful drug interactions in the elderly.       Objective   Vital Signs:  /82 (BP Location: Left arm, Patient Position: Sitting, Cuff Size: Adult)   Pulse 90   Temp 98.7 °F (37.1 °C) (Infrared)   Ht 160.7 cm (63.25\")   Wt 71.5 kg (157 lb 9.6 oz)   SpO2 97%   BMI 27.70 kg/m²   Physical Exam  Vitals reviewed.   Constitutional:       Appearance: Normal appearance. He is well-developed.   HENT:      Head: Normocephalic and atraumatic.      Right Ear: Tympanic membrane and ear canal normal.      Left Ear: Tympanic membrane and ear canal normal.      Mouth/Throat:      Pharynx: Oropharynx is clear. No posterior oropharyngeal erythema.   Eyes:      Extraocular Movements: Extraocular movements intact.      Pupils: Pupils are equal, round, and reactive to light.   Neck:      Thyroid: No thyromegaly.      Vascular: No carotid bruit. "   Cardiovascular:      Rate and Rhythm: Normal rate and regular rhythm.      Heart sounds: Normal heart sounds. No murmur heard.     No friction rub. No gallop.   Pulmonary:      Effort: Pulmonary effort is normal.      Breath sounds: Normal breath sounds.   Abdominal:      General: Bowel sounds are normal.      Palpations: Abdomen is soft.      Tenderness: There is no abdominal tenderness.   Musculoskeletal:      Cervical back: Normal range of motion and neck supple.      Right lower leg: No edema.      Left lower leg: No edema.   Lymphadenopathy:      Cervical: No cervical adenopathy.   Skin:     General: Skin is warm and dry.   Neurological:      Mental Status: He is alert and oriented to person, place, and time.      Cranial Nerves: No cranial nerve deficit.   Psychiatric:         Mood and Affect: Mood normal.         Behavior: Behavior normal.                        Assessment and Plan           1. Hyperlipidemia.  Cholesterol levels have improved significantly with rosuvastatin. LDL cholesterol decreased from 155 to 71, and triglycerides are normal. He should continue taking rosuvastatin as prescribed.    2. Prediabetes.  A1c has improved from 6.2 to 5.9, though it is not yet in the normal range. He should maintain a healthy diet and avoid weight gain to further manage his blood glucose levels.    3. Low back pain.  The pain is likely musculoskeletal in nature. He is scheduled to see a physical therapist next week for an evaluation and treatment. If physical therapy does not provide relief, x-rays may be considered to check for arthritis.    4. Depression.  He continues to see his mental health professionals for management of depression, Asperger's, attention deficit disorder, and obsessive-compulsive disorder. He reported discussing medication efficacy with his psychiatrist and will follow up with Dr. Pollock in January.    5. Health Maintenance.  He has already received his influenza vaccine on August 17,  2024, to protect himself and his immunocompromised father.    Follow-up  The patient will follow up in 6 months.    No orders of the defined types were placed in this encounter.            Follow Up   Return in about 6 months (around 2/23/2025) for follow up.  Patient was given instructions and counseling regarding his condition or for health maintenance advice. Please see specific information pulled into the AVS if appropriate.  Patient or patient representative verbalized consent for the use of Ambient Listening during the visit with  Moe Gold MD for chart documentation. 8/26/2024  15:39 EDT

## 2024-08-25 LAB — APO B SERPL-MCNC: 76 MG/DL

## 2024-08-31 ENCOUNTER — PATIENT MESSAGE (OUTPATIENT)
Dept: INTERNAL MEDICINE | Facility: CLINIC | Age: 39
End: 2024-08-31
Payer: MEDICARE

## 2024-09-03 NOTE — TELEPHONE ENCOUNTER
From: Ever Freeman  Sent: 9/3/2024 10:41 AM EDT  To: [facility] [address] Clinical Pool  Subject: Got flu and covid 19 vaccine    Ok thanks what I needed to know

## 2024-10-31 ENCOUNTER — PATIENT MESSAGE (OUTPATIENT)
Dept: INTERNAL MEDICINE | Facility: CLINIC | Age: 39
End: 2024-10-31
Payer: MEDICARE

## 2024-12-19 RX ORDER — ROSUVASTATIN CALCIUM 10 MG/1
10 TABLET, COATED ORAL DAILY
Qty: 90 TABLET | Refills: 3 | Status: SHIPPED | OUTPATIENT
Start: 2024-12-19

## 2025-01-22 DIAGNOSIS — R73.03 PREDIABETES: ICD-10-CM

## 2025-01-22 DIAGNOSIS — E78.2 MIXED HYPERLIPIDEMIA: Primary | ICD-10-CM

## 2025-01-23 ENCOUNTER — TELEPHONE (OUTPATIENT)
Dept: INTERNAL MEDICINE | Facility: CLINIC | Age: 40
End: 2025-01-23
Payer: MEDICARE

## 2025-03-03 ENCOUNTER — LAB (OUTPATIENT)
Dept: LAB | Facility: HOSPITAL | Age: 40
End: 2025-03-03
Payer: MEDICARE

## 2025-03-03 DIAGNOSIS — R73.03 PREDIABETES: ICD-10-CM

## 2025-03-03 DIAGNOSIS — E78.2 MIXED HYPERLIPIDEMIA: ICD-10-CM

## 2025-03-03 PROCEDURE — 36415 COLL VENOUS BLD VENIPUNCTURE: CPT

## 2025-03-03 PROCEDURE — 83036 HEMOGLOBIN GLYCOSYLATED A1C: CPT

## 2025-03-03 PROCEDURE — 80053 COMPREHEN METABOLIC PANEL: CPT

## 2025-03-03 PROCEDURE — 80061 LIPID PANEL: CPT

## 2025-03-03 PROCEDURE — 82172 ASSAY OF APOLIPOPROTEIN: CPT

## 2025-03-04 ENCOUNTER — OFFICE VISIT (OUTPATIENT)
Dept: INTERNAL MEDICINE | Facility: CLINIC | Age: 40
End: 2025-03-04
Payer: MEDICARE

## 2025-03-04 VITALS
TEMPERATURE: 98.6 F | HEIGHT: 63 IN | BODY MASS INDEX: 27.21 KG/M2 | SYSTOLIC BLOOD PRESSURE: 122 MMHG | OXYGEN SATURATION: 100 % | HEART RATE: 54 BPM | DIASTOLIC BLOOD PRESSURE: 76 MMHG | WEIGHT: 153.6 LBS

## 2025-03-04 DIAGNOSIS — R73.03 PREDIABETES: ICD-10-CM

## 2025-03-04 DIAGNOSIS — F33.1 MODERATE EPISODE OF RECURRENT MAJOR DEPRESSIVE DISORDER: ICD-10-CM

## 2025-03-04 DIAGNOSIS — E78.2 MIXED HYPERLIPIDEMIA: Primary | ICD-10-CM

## 2025-03-04 LAB
ALBUMIN SERPL-MCNC: 4.3 G/DL (ref 3.5–5.2)
ALBUMIN/GLOB SERPL: 1.1 G/DL
ALP SERPL-CCNC: 57 U/L (ref 39–117)
ALT SERPL W P-5'-P-CCNC: 46 U/L (ref 1–41)
ANION GAP SERPL CALCULATED.3IONS-SCNC: 8.3 MMOL/L (ref 5–15)
AST SERPL-CCNC: 27 U/L (ref 1–40)
BILIRUB SERPL-MCNC: 0.4 MG/DL (ref 0–1.2)
BUN SERPL-MCNC: 13 MG/DL (ref 6–20)
BUN/CREAT SERPL: 10 (ref 7–25)
CALCIUM SPEC-SCNC: 10 MG/DL (ref 8.6–10.5)
CHLORIDE SERPL-SCNC: 99 MMOL/L (ref 98–107)
CHOLEST SERPL-MCNC: 135 MG/DL (ref 0–200)
CO2 SERPL-SCNC: 28.7 MMOL/L (ref 22–29)
CREAT SERPL-MCNC: 1.3 MG/DL (ref 0.76–1.27)
EGFRCR SERPLBLD CKD-EPI 2021: 71.7 ML/MIN/1.73
GLOBULIN UR ELPH-MCNC: 3.8 GM/DL
GLUCOSE SERPL-MCNC: 96 MG/DL (ref 65–99)
HBA1C MFR BLD: 6 % (ref 4.8–5.6)
HDLC SERPL-MCNC: 35 MG/DL (ref 40–60)
LDLC SERPL CALC-MCNC: 75 MG/DL (ref 0–100)
LDLC/HDLC SERPL: 2.04 {RATIO}
POTASSIUM SERPL-SCNC: 3.8 MMOL/L (ref 3.5–5.2)
PROT SERPL-MCNC: 8.1 G/DL (ref 6–8.5)
SODIUM SERPL-SCNC: 136 MMOL/L (ref 136–145)
TRIGL SERPL-MCNC: 143 MG/DL (ref 0–150)
VLDLC SERPL-MCNC: 25 MG/DL (ref 5–40)

## 2025-03-04 NOTE — PROGRESS NOTES
Answers submitted by the patient for this visit:  Anxiety (Submitted on 2/25/2025)  Chief Complaint: Anxiety  Visit: follow-up  Frequency: most days  Severity: moderate  dry mouth: Yes  excessive worry: Yes  insomnia: No  irritability: Yes  malaise/fatigue: Yes  obsessions: No  Aggravated by: family issues, caffeine, social activities, work stress  Medication compliance: 26-50%  Side effects: fatigue, constipation/diarrhea  [facility]     [facility]  1985   [id number]      Patient Care Team:  Moe Gold MD as PCP - General  Moe Gold MD as PCP - Family Medicine    Chief Complaint::   Chief Complaint   Patient presents with    Anxiety    Hyperlipidemia        HPI  History of Present Illness  The patient is a 39-year-old male who comes in for follow-up of hyperlipidemia, prediabetes, and depression.    He reports an overall improvement in his health status, with no adverse effects from his current medications. He has been making efforts to increase his physical activity and has observed a reduction in his soda consumption, although he has been substituting with juices. He is attempting to limit his caffeine intake due to its propensity to induce jitteriness. He has been utilizing Steven water flavor enhancer, which is calorie-free.    He has noticed a slight weight gain, particularly in the abdominal region, despite his attempts at regular exercise.    He also mentions a transient rash that appeared during a shower session, which he speculates might be a reaction to a product he was using. The rash has since resolved.    He expresses concern about a potential measles outbreak, even though he received the MMR vaccine in his childhood.    Supplemental Information  He started a new medication prescribed by Dr. Watkins after discontinuing glimepiride due to its ineffectiveness. The only side effect he has noticed is feeling colder at times.    MEDICATIONS  Current:  Rosuvastatin    IMMUNIZATIONS  He has received both doses of the MMR vaccine.      Chronic Conditions:      Patient Active Problem List   Diagnosis    Anxiety state    Obsessive compulsive disorder    Annual physical exam    Moderate episode of recurrent major depressive disorder    Hyperlipidemia    Allergic rhinitis    Asperger's disorder    Prediabetes    Attention deficit disorder of childhood with hyperactivity        Past Medical History:   Diagnosis Date    ADHD (attention deficit hyperactivity disorder) 1989    I dont remember exact date i was diagnosed    Alcohol abuse     Allergic     Demoral    Anxiety     Asperger's syndrome     Cholelithiasis 5/15/2015    Had surgery to remove gallbladder in     Neurocardiogenic syncope     OCD (obsessive compulsive disorder)     Scoliosis Na    Life long    Severe hearing loss     Hearing loss, severe right sensorineural       Past Surgical History:   Procedure Laterality Date    CHOLECYSTECTOMY  2015    TYMPANOSTOMY  1986       Family History   Problem Relation Age of Onset    Alcohol abuse Son     Anxiety disorder Son        Social History     Socioeconomic History    Marital status: Single   Tobacco Use    Smoking status: Former     Current packs/day: 0.00     Average packs/day: 1 pack/day for 1 year (1.0 ttl pk-yrs)     Types: Cigarettes, Cigars     Start date:      Quit date: 2020     Years since quittin.1    Smokeless tobacco: Never    Tobacco comments:     Only cigars no cigarettes   Vaping Use    Vaping status: Never Used   Substance and Sexual Activity    Alcohol use: Not Currently     Alcohol/week: 3.0 standard drinks of alcohol     Types: 2 Shots of liquor, 1 Drinks containing 0.5 oz of alcohol per week     Comment: Most intermittently i would say quit hard liquor last year,    Drug use: Not Currently    Sexual activity: Not Currently     Partners: Female, Male     Birth control/protection: Essure, Bilateral salpingectomy   "      Allergies   Allergen Reactions    Demerol [Meperidine] Hives         Current Outpatient Medications:     busPIRone (BUSPAR) 30 MG tablet, Take 1.5 tablets by mouth Every 12 (Twelve) Hours., Disp: , Rfl:     clonazePAM (KlonoPIN) 1 MG tablet, Take 0.5 tablets by mouth As Needed., Disp: , Rfl:     guanFACINE (TENEX) 1 MG tablet, Take  by mouth Every 12 (Twelve) Hours., Disp: , Rfl:     hydrOXYzine (ATARAX) 50 MG tablet, Take 1 tablet by mouth Daily As Needed., Disp: , Rfl:     omeprazole (priLOSEC) 20 MG capsule, TAKE ONE CAPSULE BY MOUTH DAILY (Patient taking differently: Take 1 capsule by mouth Daily As Needed.), Disp: 30 capsule, Rfl: 5    rosuvastatin (CRESTOR) 10 MG tablet, TAKE 1 TABLET BY MOUTH DAILY, Disp: 90 tablet, Rfl: 3    ziprasidone (GEODON) 80 MG capsule, Take 1 capsule by mouth 2 (Two) Times a Day With Meals., Disp: , Rfl:     Review of Systems     Vital Signs  Vitals:    03/04/25 0930   BP: 122/76   BP Location: Left arm   Patient Position: Sitting   Cuff Size: Adult   Pulse: 54   Temp: 98.6 °F (37 °C)   TempSrc: Infrared   SpO2: 100%   Weight: 69.7 kg (153 lb 9.6 oz)   Height: 160.7 cm (63.27\")   PainSc: 0-No pain       Physical Exam  Vitals reviewed.   Constitutional:       Appearance: Normal appearance. He is well-developed.   HENT:      Head: Normocephalic and atraumatic.   Neck:      Thyroid: No thyromegaly.      Vascular: No carotid bruit.   Cardiovascular:      Rate and Rhythm: Normal rate and regular rhythm.      Heart sounds: Normal heart sounds. No murmur heard.     No friction rub. No gallop.   Pulmonary:      Effort: Pulmonary effort is normal.      Breath sounds: Normal breath sounds.   Musculoskeletal:      Cervical back: Normal range of motion and neck supple.      Right lower leg: No edema.      Left lower leg: No edema.   Lymphadenopathy:      Cervical: No cervical adenopathy.   Skin:     General: Skin is warm and dry.   Neurological:      Mental Status: He is alert and oriented " to person, place, and time.      Cranial Nerves: No cranial nerve deficit.        Physical Exam  Vital Signs  Patient's weight is 153.    Procedures    ACE III MINI        Results  Laboratory Studies  A1c is stable at 6.0%.           Assessment/Plan:    Diagnoses and all orders for this visit:    1. Mixed hyperlipidemia (Primary)    2. Prediabetes    3. Moderate episode of recurrent major depressive disorder      Assessment & Plan  1. Hyperlipidemia.  His lipid levels are well-managed with the current rosuvastatin regimen.    2. Prediabetes.  His A1c remains stable at 6.0 percent, indicating that his condition is not progressing towards diabetes. The importance of dietary modifications, specifically reducing simple carbohydrates, and maintaining regular exercise was emphasized.    3. Depression.  He reports a decrease in anxiety symptoms. He will continue with his current medication regimen and maintain regular follow-ups with his psychiatrist.    4. Rash.  He reported a transient rash that appeared after a hot shower but resolved on its own. No further action is required as it is not a cause for concern.    5. Vaccination status.  He expressed concern about measles, but it was confirmed that he received the MMR vaccine as a child, providing lifelong immunity. No booster is needed.    Follow-up  The patient will follow up in 6 months.      Plan of care reviewed with patient at the conclusion of today's visit. Education was provided regarding diagnosis, management, and any prescribed or recommended OTC medications.Patient verbalizes understanding of and agreement with management plan.     Patient or patient representative verbalized consent for the use of Ambient Listening during the visit with  Moe Gold MD for chart documentation. 3/8/2025  12:14 JEWEL Gold MD

## 2025-03-07 LAB — APO B SERPL-MCNC: 86 MG/DL

## 2025-07-07 ENCOUNTER — PATIENT MESSAGE (OUTPATIENT)
Dept: INTERNAL MEDICINE | Facility: CLINIC | Age: 40
End: 2025-07-07
Payer: MEDICARE